# Patient Record
Sex: FEMALE | Race: BLACK OR AFRICAN AMERICAN | NOT HISPANIC OR LATINO | Employment: OTHER | URBAN - METROPOLITAN AREA
[De-identification: names, ages, dates, MRNs, and addresses within clinical notes are randomized per-mention and may not be internally consistent; named-entity substitution may affect disease eponyms.]

---

## 2017-08-28 ENCOUNTER — OFFICE VISIT (OUTPATIENT)
Dept: NEUROLOGY | Facility: HOSPITAL | Age: 77
End: 2017-08-28
Attending: INTERNAL MEDICINE
Payer: MEDICARE

## 2017-08-28 VITALS
SYSTOLIC BLOOD PRESSURE: 159 MMHG | HEART RATE: 64 BPM | WEIGHT: 126 LBS | BODY MASS INDEX: 20.25 KG/M2 | HEIGHT: 66 IN | DIASTOLIC BLOOD PRESSURE: 80 MMHG | TEMPERATURE: 97 F

## 2017-08-28 DIAGNOSIS — R10.9 ABDOMINAL PAIN, UNSPECIFIED LOCATION: Primary | ICD-10-CM

## 2017-08-28 PROCEDURE — 99214 OFFICE O/P EST MOD 30 MIN: CPT | Performed by: INTERNAL MEDICINE

## 2017-08-28 RX ORDER — AMLODIPINE BESYLATE 5 MG/1
5 TABLET ORAL DAILY
COMMUNITY
End: 2019-08-27 | Stop reason: SDUPTHER

## 2017-08-28 RX ORDER — METOPROLOL SUCCINATE 50 MG/1
50 TABLET, EXTENDED RELEASE ORAL DAILY
COMMUNITY
End: 2019-08-27

## 2017-08-28 NOTE — PROGRESS NOTES
"[facility]    CC: abdominal pain    HPI 77 y.o. female with past medical history of Bell's Palsy, HTN who presents for further evaluation of two months of mild, cramping abdominal pain near her umbilicus which lasts for a few minutes and then resolves following a BM.  She also reports recently her stools are watery about 2 days per week.  She reports that milk products giver her loose stools.  She has changed to lactaid milk but still has intermittent loose stools.  She denies use of sugar substitute products.  She has taken recent antibiotics-nitrofurantoin- for a recent UTI.    Previous she was prescribed bentyl but reports that she did not tolerate this due to lightheadedness and it made her sleepy.    Records reviewed.      Past Medical History:   Diagnosis Date    Arthritis     osteoarthritis of ankle    Hyperglycemia     Hypertension      Social History  No tobacco, alcohol or illicit drug use    Family History  No family history of colon cancer    Review of Systems  General ROS: negative for chills, fever.  Positive for fluctuations in her weight-both gain and loss  Gastrointestinal ROS: positive for abdominal cramping and loose stool.  No melena  Genito-Urinary ROS: no dysuria, trouble voiding, or hematuria    Physical Examination  BP (!) 159/80   Pulse 64   Temp 97.3 °F (36.3 °C) (Oral)   Ht 5' 6" (1.676 m)   Wt 57.2 kg (126 lb)   BMI 20.34 kg/m²   General appearance: alert, cooperative, no distress  HENT: Normocephalic, atraumatic, neck symmetrical, no nasal discharge   Eyes: conjunctivae/corneas clear, PERRL, EOM's intact  Lungs: clear to auscultation bilaterally, no dullness to percussion bilaterally  Heart: regular rate and rhythm without rub; no displacement of the PMI   Abdomen: soft, non-tender; bowel sounds normoactive; no organomegaly  Extremities: extremities symmetric; no clubbing, cyanosis, or edema  Integument: Skin color, texture, turgor normal; no rashes; hair distrubution " normal  Neurologic: Alert and oriented X 3, normal strength, normal coordination and gait  Psychiatric: no pressured speech; normal affect; no evidence of impaired cognition     Labs:  2006  H/H 13.7/42.7  Plt 165      Imaging: CXR 12/6/12 Clear lung fields bilaterally, no pneumothorax    Independently reviewed.    Assessment: The patient is a 78 yo female with past medical history of Bell's palsy who presents with recent onset of abdominal cramping and occasional loose stool associated with milk products as well as occasional constipation. She also reports a 10 lb weight loss over the past year but denies vomiting or early satiety.     Plan:   Conservative management for now as symptoms are variable, mild and currently improved. She lost 10 lbs over the past year but her weight is currently stable. She has no other symptoms which sound alarming.   Lactose free diet   Conservative management unless symptoms are progressive.   Consider CT scan abdomen/pelvis as next step if weight loss recurs then EGD if CT unrevealing and weight loss persists         Chaim Huitron MD   [address] [place] [address], [address]   [place] [id number] [phone]

## 2017-09-20 ENCOUNTER — TELEPHONE (OUTPATIENT)
Dept: NEUROLOGY | Facility: HOSPITAL | Age: 77
End: 2017-09-20

## 2017-09-20 NOTE — TELEPHONE ENCOUNTER
----- Message from Gretchen Fung sent at 9/20/2017 10:36 AM CDT -----  Contact: Patient  GI- Patient was returning Dr. Huitron call. Patient can be reached at [phone]

## 2017-09-28 DIAGNOSIS — Z12.31 VISIT FOR SCREENING MAMMOGRAM: Primary | ICD-10-CM

## 2017-10-06 ENCOUNTER — HOSPITAL ENCOUNTER (OUTPATIENT)
Dept: RADIOLOGY | Facility: HOSPITAL | Age: 77
Discharge: HOME | End: 2017-10-06
Attending: FAMILY MEDICINE
Payer: MEDICARE

## 2017-10-06 VITALS — HEIGHT: 66 IN | BODY MASS INDEX: 20.25 KG/M2 | WEIGHT: 126 LBS

## 2017-10-06 DIAGNOSIS — Z12.31 VISIT FOR SCREENING MAMMOGRAM: ICD-10-CM

## 2017-10-06 PROCEDURE — 77067 SCR MAMMO BI INCL CAD: CPT | Mod: 26,,, | Performed by: RADIOLOGY

## 2017-10-06 PROCEDURE — 77067 SCR MAMMO BI INCL CAD: CPT | Mod: TC

## 2018-08-27 ENCOUNTER — LAB VISIT (OUTPATIENT)
Dept: LAB | Facility: HOSPITAL | Age: 78
End: 2018-08-27
Attending: FAMILY MEDICINE
Payer: MEDICARE

## 2018-08-27 ENCOUNTER — OFFICE VISIT (OUTPATIENT)
Dept: FAMILY MEDICINE | Facility: HOSPITAL | Age: 78
End: 2018-08-27
Attending: FAMILY MEDICINE
Payer: MEDICARE

## 2018-08-27 VITALS
HEIGHT: 66 IN | BODY MASS INDEX: 21.11 KG/M2 | WEIGHT: 131.38 LBS | SYSTOLIC BLOOD PRESSURE: 139 MMHG | DIASTOLIC BLOOD PRESSURE: 76 MMHG | HEART RATE: 75 BPM

## 2018-08-27 DIAGNOSIS — I10 ESSENTIAL HYPERTENSION: ICD-10-CM

## 2018-08-27 DIAGNOSIS — R79.9 ABNORMAL FINDING OF BLOOD CHEMISTRY: ICD-10-CM

## 2018-08-27 DIAGNOSIS — R73.9 HYPERGLYCEMIA: ICD-10-CM

## 2018-08-27 DIAGNOSIS — R73.03 PREDIABETES: ICD-10-CM

## 2018-08-27 DIAGNOSIS — M81.0 AGE-RELATED OSTEOPOROSIS WITHOUT CURRENT PATHOLOGICAL FRACTURE: ICD-10-CM

## 2018-08-27 DIAGNOSIS — S29.011A MUSCLE STRAIN OF ANTERIOR CHEST WALL: Primary | ICD-10-CM

## 2018-08-27 LAB
ALBUMIN SERPL BCP-MCNC: 4 G/DL
ALP SERPL-CCNC: 68 U/L
ALT SERPL W/O P-5'-P-CCNC: 9 U/L
ANION GAP SERPL CALC-SCNC: 6 MMOL/L
AST SERPL-CCNC: 15 U/L
BASOPHILS # BLD AUTO: 0.01 K/UL
BASOPHILS NFR BLD: 0.2 %
BILIRUB SERPL-MCNC: 1.1 MG/DL
BUN SERPL-MCNC: 13 MG/DL
CALCIUM SERPL-MCNC: 9.9 MG/DL
CHLORIDE SERPL-SCNC: 104 MMOL/L
CHOLEST SERPL-MCNC: 238 MG/DL
CHOLEST/HDLC SERPL: 4.3 {RATIO}
CO2 SERPL-SCNC: 31 MMOL/L
CREAT SERPL-MCNC: 0.8 MG/DL
DIFFERENTIAL METHOD: ABNORMAL
EOSINOPHIL # BLD AUTO: 0.1 K/UL
EOSINOPHIL NFR BLD: 1.3 %
ERYTHROCYTE [DISTWIDTH] IN BLOOD BY AUTOMATED COUNT: 14.6 %
EST. GFR  (AFRICAN AMERICAN): >60 ML/MIN/1.73 M^2
EST. GFR  (NON AFRICAN AMERICAN): >60 ML/MIN/1.73 M^2
ESTIMATED AVG GLUCOSE: 108 MG/DL
GLUCOSE SERPL-MCNC: 90 MG/DL
HBA1C MFR BLD HPLC: 5.4 %
HCT VFR BLD AUTO: 44.5 %
HDLC SERPL-MCNC: 55 MG/DL
HDLC SERPL: 23.1 %
HGB BLD-MCNC: 14 G/DL
LDLC SERPL CALC-MCNC: 154 MG/DL
LYMPHOCYTES # BLD AUTO: 1.6 K/UL
LYMPHOCYTES NFR BLD: 31.1 %
MCH RBC QN AUTO: 27.6 PG
MCHC RBC AUTO-ENTMCNC: 31.5 G/DL
MCV RBC AUTO: 88 FL
MONOCYTES # BLD AUTO: 0.4 K/UL
MONOCYTES NFR BLD: 8.1 %
NEUTROPHILS # BLD AUTO: 3.1 K/UL
NEUTROPHILS NFR BLD: 59.3 %
NONHDLC SERPL-MCNC: 183 MG/DL
PLATELET # BLD AUTO: 225 K/UL
PMV BLD AUTO: 9.6 FL
POTASSIUM SERPL-SCNC: 4.2 MMOL/L
PROT SERPL-MCNC: 7.2 G/DL
RBC # BLD AUTO: 5.07 M/UL
SODIUM SERPL-SCNC: 141 MMOL/L
TRIGL SERPL-MCNC: 145 MG/DL
TSH SERPL DL<=0.005 MIU/L-ACNC: 1.4 UIU/ML
WBC # BLD AUTO: 5.21 K/UL

## 2018-08-27 PROCEDURE — 80061 LIPID PANEL: CPT

## 2018-08-27 PROCEDURE — 99213 OFFICE O/P EST LOW 20 MIN: CPT | Performed by: STUDENT IN AN ORGANIZED HEALTH CARE EDUCATION/TRAINING PROGRAM

## 2018-08-27 PROCEDURE — 80053 COMPREHEN METABOLIC PANEL: CPT

## 2018-08-27 PROCEDURE — 83036 HEMOGLOBIN GLYCOSYLATED A1C: CPT

## 2018-08-27 PROCEDURE — 84443 ASSAY THYROID STIM HORMONE: CPT

## 2018-08-27 PROCEDURE — 36415 COLL VENOUS BLD VENIPUNCTURE: CPT

## 2018-08-27 PROCEDURE — 85025 COMPLETE CBC W/AUTO DIFF WBC: CPT

## 2018-08-27 RX ORDER — METOPROLOL SUCCINATE 25 MG/1
TABLET, EXTENDED RELEASE ORAL
Refills: 1 | COMMUNITY
Start: 2018-08-08 | End: 2019-08-27

## 2018-08-27 RX ORDER — SULFAMETHOXAZOLE AND TRIMETHOPRIM 800; 160 MG/1; MG/1
TABLET ORAL
Refills: 0 | COMMUNITY
Start: 2018-06-08 | End: 2019-09-30

## 2018-08-27 RX ORDER — ESTRADIOL 0.1 MG/G
CREAM VAGINAL
Refills: 4 | COMMUNITY
Start: 2018-07-17 | End: 2020-01-06

## 2018-08-27 NOTE — PROGRESS NOTES
Subjective:       Patient ID: Laura Patel is a 78 y.o. female.    Chief Complaint: musculoskeletal pain    77 yo F with a past medical history of Arthritis, Hyperglycemia, osteoporosis, and Hypertension who presents to clinic for evaluation of muscle tenderness. Patient states that the pain began after lifting boxes when moving last month. The pain is in the L axillary region. Pain is worse with movement and is worse with palpation to the area. Has been taking tylenol with alleviation of the pain. Pain has been gradually improving since the inciting event.     Past Medical History:  No date: Arthritis      Comment:  osteoarthritis of ankle  No date: Hyperglycemia  No date: Hypertension    Past Surgical History:  No date: BREAST BIOPSY  No date: HYSTERECTOMY  No date: OOPHORECTOMY    Review of patient's family history indicates:  Problem: Breast cancer        Social History    Tobacco Use      Smoking status: Never Smoker      Smokeless tobacco: Never Used    Alcohol use: No    Drug use: No     Current Outpatient Medications on File Prior to Visit:  acetaminophen (TYLENOL) 325 MG tablet, Take 325 mg by mouth every 6 (six) hours as needed.  amlodipine (NORVASC) 5 MG tablet, Take 5 mg by mouth once daily.  metoprolol succinate (TOPROL-XL) 25 MG 24 hr tablet, TK 1 T PO D  metoprolol succinate (TOPROL-XL) 50 MG 24 hr tablet, Take 50 mg by mouth once daily.    Review of patient's allergies indicates:   -- Dicyclomine -- Other (See Comments)    --  Cause dizziness        Review of Systems   Constitutional: Negative for activity change, chills, fatigue and fever.   HENT: Negative for congestion and sinus pressure.    Eyes: Negative for visual disturbance.   Respiratory: Negative for chest tightness and shortness of breath.    Cardiovascular: Negative for chest pain.   Gastrointestinal: Negative for abdominal distention, abdominal pain, diarrhea, nausea and vomiting.   Endocrine: Negative for polyuria.   Genitourinary:  Negative for frequency.   Musculoskeletal: Positive for arthralgias and myalgias.   Skin: Negative for color change.   Neurological: Negative for dizziness, weakness and light-headedness.   Psychiatric/Behavioral: Negative for agitation and behavioral problems.       Objective:      Vitals:    08/27/18 0922   BP: 139/76   Pulse: 75     Physical Exam   Constitutional: She is oriented to person, place, and time. She appears well-developed and well-nourished. No distress.   HENT:   Head: Normocephalic and atraumatic.   Eyes: EOM are normal.   Neck: Normal range of motion. Neck supple.   Cardiovascular: Normal rate and regular rhythm.   Pulmonary/Chest: Effort normal and breath sounds normal.   Abdominal: Soft. Bowel sounds are normal. She exhibits no distension. There is no tenderness.   Musculoskeletal: She exhibits no edema or tenderness.   L axillary tender to palpation. Pain with movement. Full range of motion.    Neurological: She is alert and oriented to person, place, and time.   Skin: Skin is warm.   Psychiatric: She has a normal mood and affect. Her behavior is normal.       Assessment:       1. Muscle strain of anterior chest wall    2. Age-related osteoporosis without current pathological fracture    3. Essential hypertension    4. Hyperglycemia     5. Prediabetes     6. Abnormal finding of blood chemistry         Plan:       Muscle strain of anterior chest wall  Comments:  Will continue with tylenol prn    Age-related osteoporosis without current pathological fracture  -     Lipid panel; Future; Expected date: 08/27/2018  -     TSH; Future; Expected date: 08/27/2018    Essential hypertension  -     Hemoglobin A1c; Future; Expected date: 08/27/2018  -     CBC auto differential; Future; Expected date: 08/27/2018  -     Comprehensive metabolic panel; Future; Expected date: 08/27/2018    Hyperglycemia   -     Hemoglobin A1c; Future; Expected date: 08/27/2018    Prediabetes   -     TSH; Future; Expected date:  08/27/2018    Abnormal finding of blood chemistry   -     Lipid panel; Future; Expected date: 08/27/2018      Follow-up in about 4 weeks (around 9/24/2018) for review of labs.

## 2018-09-01 NOTE — PROGRESS NOTES
Case discussed with resident at time of visit.  I have reviewed and concur with the resident's evaluation, assessment, and plan.  Continue Tylenol for musculoskeletal pain LUE - improving post injury.

## 2019-08-27 ENCOUNTER — OFFICE VISIT (OUTPATIENT)
Dept: FAMILY MEDICINE | Facility: HOSPITAL | Age: 79
End: 2019-08-27
Attending: FAMILY MEDICINE
Payer: MEDICARE

## 2019-08-27 VITALS
HEART RATE: 78 BPM | SYSTOLIC BLOOD PRESSURE: 153 MMHG | HEIGHT: 66 IN | WEIGHT: 126.13 LBS | BODY MASS INDEX: 20.27 KG/M2 | DIASTOLIC BLOOD PRESSURE: 76 MMHG

## 2019-08-27 DIAGNOSIS — M80.00XS AGE-RELATED OSTEOPOROSIS WITH CURRENT PATHOLOGICAL FRACTURE, SEQUELA: Primary | ICD-10-CM

## 2019-08-27 DIAGNOSIS — I10 ESSENTIAL HYPERTENSION: ICD-10-CM

## 2019-08-27 DIAGNOSIS — K59.1 FUNCTIONAL DIARRHEA: ICD-10-CM

## 2019-08-27 PROCEDURE — 99213 OFFICE O/P EST LOW 20 MIN: CPT | Performed by: STUDENT IN AN ORGANIZED HEALTH CARE EDUCATION/TRAINING PROGRAM

## 2019-08-27 RX ORDER — AMLODIPINE BESYLATE 10 MG/1
10 TABLET ORAL DAILY
Qty: 30 TABLET | Refills: 11 | Status: SHIPPED | OUTPATIENT
Start: 2019-08-27 | End: 2020-03-09

## 2019-08-27 RX ORDER — OLMESARTAN MEDOXOMIL 40 MG/1
40 TABLET ORAL
COMMUNITY
Start: 2014-07-11 | End: 2019-08-27

## 2019-08-27 RX ORDER — RISEDRONATE SODIUM 150 MG/1
150 TABLET, FILM COATED ORAL
Qty: 1 TABLET | Refills: 11 | Status: SHIPPED | OUTPATIENT
Start: 2019-08-27 | End: 2019-08-27

## 2019-08-27 RX ORDER — LOPERAMIDE HYDROCHLORIDE 2 MG/1
2 CAPSULE ORAL 3 TIMES DAILY PRN
Qty: 30 CAPSULE | Refills: 3 | Status: SHIPPED | OUTPATIENT
Start: 2019-08-27 | End: 2019-09-06

## 2019-08-27 RX ORDER — RISEDRONATE SODIUM 150 MG/1
150 TABLET, FILM COATED ORAL
Qty: 1 TABLET | Refills: 11 | Status: SHIPPED | OUTPATIENT
Start: 2019-08-27 | End: 2019-09-30

## 2019-08-28 PROBLEM — K59.1 FUNCTIONAL DIARRHEA: Status: ACTIVE | Noted: 2019-08-28

## 2019-08-28 PROBLEM — I10 ESSENTIAL HYPERTENSION: Status: ACTIVE | Noted: 2019-08-28

## 2019-08-28 PROBLEM — M80.00XA AGE-RELATED OSTEOPOROSIS WITH CURRENT PATHOLOGICAL FRACTURE: Status: ACTIVE | Noted: 2019-08-28

## 2019-08-28 NOTE — PROGRESS NOTES
Subjective                                                                                                                                                                           Chief Complaint: ER follow up    Laura Patel is a 79 y.o. female who  has a past medical history of Arthritis, Hyperglycemia, and Hypertension. The patient presents to clinic for ER follow up. Pt with recent ER visit after a fall in which she sustained an orbital floor fracture. Pt states that she feels fine following the fall and has already had follow up with ENT.     Has questions regarding her medications and states that she needs refills on several of her medications. Pt states that she recently stopped taking a certain medication and after stopping that medication she has developed diarrhea.    HTN has been well controlled in the past but pressure is slightly elevated today. Will likely need to increase dose of norvasc.    Pt diagnosed with osteoporosis 5 years ago with DXA scan but is unaware of diagnosis and has never been on any medications. Will need labs re-ordered at this visit as well.     Health Maintenance   Topic Date Due    TETANUS VACCINE  04/11/1960    Pneumococcal Vaccine (65+ Low/Medium Risk) (1 of 2 - PCV13) 04/11/2007    DEXA SCAN  09/24/2015    Lipid Panel  08/27/2023        Review of Systems   Constitutional: Negative for activity change, chills, fatigue and fever.   HENT: Negative for congestion and sinus pressure.    Eyes: Negative for visual disturbance.   Respiratory: Negative for chest tightness and shortness of breath.    Cardiovascular: Negative for chest pain.   Gastrointestinal: Positive for diarrhea. Negative for abdominal distention, abdominal pain, blood in stool, nausea and vomiting.   Endocrine: Negative for polyuria.   Genitourinary: Negative for frequency.   Musculoskeletal: Negative for arthralgias and myalgias.   Skin: Positive for color change and wound (bruising to L face).  "  Neurological: Negative for dizziness, weakness and light-headedness.   Psychiatric/Behavioral: Negative for agitation and behavioral problems.        Objective                                                                                                                                                                             Vitals:    08/27/19 1122   BP: (!) 153/76   Pulse: 78   Weight: 57.2 kg (126 lb 1.7 oz)   Height: 5' 6" (1.676 m)      Body mass index is 20.35 kg/m².    Physical Exam   Constitutional: She is oriented to person, place, and time. She appears well-developed and well-nourished. No distress.   HENT:   Head: Normocephalic.   Bruising observed to L orbital area. No deformities noted.    Eyes: Conjunctivae are normal.   Neck: Normal range of motion. Neck supple.   Cardiovascular: Normal rate and regular rhythm.   Pulmonary/Chest: Effort normal and breath sounds normal.   Abdominal: Soft. Bowel sounds are normal. She exhibits no distension. There is no tenderness.   Musculoskeletal: She exhibits no edema or tenderness.   Neurological: She is alert and oriented to person, place, and time.   Skin: Skin is warm.   Psychiatric: She has a normal mood and affect. Her behavior is normal.   Nursing note and vitals reviewed.      Laboratory:  Lab Results   Component Value Date    WBC 5.21 08/27/2018    HGB 14.0 08/27/2018    HCT 44.5 08/27/2018    MCV 88 08/27/2018     08/27/2018       Chemistry        Component Value Date/Time     08/27/2019 1246    K 3.9 08/27/2019 1246     08/27/2019 1246    CO2 30 (H) 08/27/2019 1246    BUN 9 08/27/2019 1246    CREATININE 0.9 08/27/2019 1246    GLU 94 08/27/2019 1246        Component Value Date/Time    CALCIUM 9.7 08/27/2019 1246    ALKPHOS 89 08/27/2019 1246    AST 22 08/27/2019 1246    ALT 17 08/27/2019 1246    BILITOT 1.4 (H) 08/27/2019 1246    ESTGFRAFRICA >60 08/27/2019 1246    EGFRNONAA >60 08/27/2019 1246          Lab Results   Component " Value Date    PHOS 3.5 08/27/2019     Lab Results   Component Value Date    CHOL 238 (H) 08/27/2018    HDL 55 08/27/2018    LDLCALC 154.0 08/27/2018    TRIG 145 08/27/2018     The 10-year ASCVD risk score (Andrew CHENG Jr., et al., 2013) is: 24.1%    Values used to calculate the score:      Age: 79 years      Sex: Female      Is Non- : Yes      Diabetic: No      Tobacco smoker: No      Systolic Blood Pressure: 153 mmHg      Is BP treated: Yes      HDL Cholesterol: 55 mg/dL      Total Cholesterol: 238 mg/dL     Lab Results   Component Value Date    TSH 1.404 08/27/2018     Lab Results   Component Value Date    HGBA1C 5.4 08/27/2018     Assessment/Plan                                                                                                                                                                Laura Patel is a 79 y.o. female who presents to clinic with:    1. Age-related osteoporosis with current pathological fracture, sequela    2. Essential hypertension    3. Functional diarrhea         Problem List Items Addressed This Visit        Cardiac/Vascular    Essential hypertension    Relevant Medications    amLODIPine (NORVASC) 10 MG tablet       GI    Functional diarrhea    Relevant Medications    loperamide (IMODIUM) 2 mg capsule       Orthopedic    Age-related osteoporosis with current pathological fracture - Primary    Relevant Medications    risedronate (ACTONEL) 150 MG Tab    Other Relevant Orders    Comprehensive metabolic panel (Completed)    Vitamin D (Completed)    DXA Bone Density Spine And Hip    Phosphorus (Completed)    PTH, intact (Completed)          Medication List with Changes/Refills   New Medications    LOPERAMIDE (IMODIUM) 2 MG CAPSULE    Take 1 capsule (2 mg total) by mouth 3 (three) times daily as needed for Diarrhea.    RISEDRONATE (ACTONEL) 150 MG TAB    Take 1 tablet (150 mg total) by mouth every 30 days.   Current Medications    ACETAMINOPHEN (TYLENOL) 325 MG  TABLET    Take 325 mg by mouth every 6 (six) hours as needed.    ACYCLOVIR (ZOVIRAX) 800 MG TAB    Take 1 tablet (800 mg total) by mouth 5 (five) times daily.    ESTRADIOL (ESTRACE) 0.01 % (0.1 MG/GRAM) VAGINAL CREAM        SULFAMETHOXAZOLE-TRIMETHOPRIM 800-160MG (BACTRIM DS) 800-160 MG TAB    TK 1 T PO 3 TIMES Q WK FOR 7 DAYS   Changed and/or Refilled Medications    Modified Medication Previous Medication    AMLODIPINE (NORVASC) 10 MG TABLET amlodipine (NORVASC) 5 MG tablet       Take 1 tablet (10 mg total) by mouth once daily.    Take 5 mg by mouth once daily.   Discontinued Medications    METOPROLOL SUCCINATE (TOPROL-XL) 25 MG 24 HR TABLET    TK 1 T PO D    METOPROLOL SUCCINATE (TOPROL-XL) 50 MG 24 HR TABLET    Take 50 mg by mouth once daily.    OLMESARTAN (BENICAR) 40 MG TABLET    Take 40 mg by mouth.       Patient counseled about the importance of healthy dietary habits as well as routine physical activity and exercise for better health outcomes.    The patient's diagnosis, medications, and proper use of medications were dicussed. The importance of close follow up to discuss labs, modify medications, and monitor any potential side effects was also discussed.     Follow up in about 3 weeks (around 9/17/2019). Will review labs at that time. Follow up for further workup and reassessment or sooner as needed.    Patient expressed understanding after counseling regarding diagnosis and recommendations.        Julian Mason MD  [facility] PGY-3

## 2019-08-29 ENCOUNTER — TELEPHONE (OUTPATIENT)
Dept: FAMILY MEDICINE | Facility: HOSPITAL | Age: 79
End: 2019-08-29

## 2019-09-05 DIAGNOSIS — M81.0 AGE-RELATED OSTEOPOROSIS WITHOUT CURRENT PATHOLOGICAL FRACTURE: Primary | ICD-10-CM

## 2019-09-30 ENCOUNTER — OFFICE VISIT (OUTPATIENT)
Dept: FAMILY MEDICINE | Facility: HOSPITAL | Age: 79
End: 2019-09-30
Attending: FAMILY MEDICINE
Payer: MEDICARE

## 2019-09-30 VITALS
WEIGHT: 125.88 LBS | HEART RATE: 83 BPM | HEIGHT: 66 IN | DIASTOLIC BLOOD PRESSURE: 83 MMHG | SYSTOLIC BLOOD PRESSURE: 157 MMHG | BODY MASS INDEX: 20.23 KG/M2

## 2019-09-30 DIAGNOSIS — M80.00XS OSTEOPOROSIS WITH CURRENT PATHOLOGICAL FRACTURE, UNSPECIFIED OSTEOPOROSIS TYPE, SEQUELA: Primary | ICD-10-CM

## 2019-09-30 DIAGNOSIS — E55.9 VITAMIN D INSUFFICIENCY: ICD-10-CM

## 2019-09-30 DIAGNOSIS — D12.6 COLONIC ADENOMA: ICD-10-CM

## 2019-09-30 DIAGNOSIS — I10 ESSENTIAL HYPERTENSION: ICD-10-CM

## 2019-09-30 PROCEDURE — 99213 OFFICE O/P EST LOW 20 MIN: CPT | Performed by: STUDENT IN AN ORGANIZED HEALTH CARE EDUCATION/TRAINING PROGRAM

## 2019-09-30 RX ORDER — VIT C/E/ZN/COPPR/LUTEIN/ZEAXAN 250MG-90MG
2000 CAPSULE ORAL DAILY
Qty: 60 CAPSULE | Refills: 11 | Status: SHIPPED | OUTPATIENT
Start: 2019-09-30 | End: 2019-09-30

## 2019-09-30 NOTE — PROGRESS NOTES
Subjective                                                                                                                                                                           Chief Complaint: Osteoporosis follow up    Laura Patel is a 79 y.o. female who  has a past medical history of Arthritis, Hyperglycemia, and Hypertension. The patient presents to clinic for follow up. Labs ordered at last visit reviewed with pt. Pt scheduled for repeat DXA scan but was not approved by insurance.     Pt with recent ER visit after a fall in which she sustained an orbital floor fracture. Pt states that she feels fine following the fall and has already had follow up with ENT. Patient is also being worked up by ENT for mass on L side of neck.     HTN has been well controlled in the past but pressure remains slightly elevated. Pt did not increase dose of norvasc as was discussed at last visit.     Vit D found to be low on labs. Pt has never been on either bisphosphonate or vit D but will need to begin.    Per chart review, colonoscopy 6 years ago revealed polyps, one of which was found to be adenoma and thus patient needed repeat colonoscopy in 5 years and is now overdue for one.     Health Maintenance   Topic Date Due    TETANUS VACCINE  04/11/1960    Pneumococcal Vaccine (65+ Low/Medium Risk) (1 of 2 - PCV13) 04/11/2007    DEXA SCAN  09/24/2015    Lipid Panel  08/27/2023        Review of Systems   Constitutional: Negative for activity change, chills, fatigue and fever.   HENT: Negative for congestion and sinus pressure.    Eyes: Negative for visual disturbance.   Respiratory: Negative for chest tightness and shortness of breath.    Cardiovascular: Negative for chest pain.   Gastrointestinal: Negative for abdominal distention, abdominal pain, blood in stool, diarrhea, nausea and vomiting.   Endocrine: Negative for polyuria.   Genitourinary: Negative for frequency.   Musculoskeletal: Positive for back pain. Negative for  "arthralgias and myalgias.   Skin: Negative for color change and wound.   Neurological: Negative for dizziness, weakness and light-headedness.   Psychiatric/Behavioral: Negative for agitation and behavioral problems.        Objective                                                                                                                                                                             Vitals:    09/30/19 1312   BP: (!) 157/83   BP Location: Right arm   Patient Position: Sitting   BP Method: Medium (Automatic)   Pulse: 83   Weight: 57.1 kg (125 lb 14.1 oz)   Height: 5' 6" (1.676 m)      Body mass index is 20.32 kg/m².    Physical Exam   Constitutional: She is oriented to person, place, and time. She appears well-developed and well-nourished. No distress.   HENT:   Head: Normocephalic.   Eyes: Conjunctivae are normal.   Neck: Normal range of motion. Neck supple.   Palpable mass to L side of neck   Cardiovascular: Normal rate and regular rhythm.   Pulmonary/Chest: Effort normal and breath sounds normal.   Abdominal: Soft. Bowel sounds are normal. She exhibits no distension. There is no tenderness.   Musculoskeletal: She exhibits no edema or tenderness.   Neurological: She is alert and oriented to person, place, and time.   Skin: Skin is warm.   Psychiatric: She has a normal mood and affect. Her behavior is normal.   Nursing note and vitals reviewed.      Laboratory:  Lab Results   Component Value Date    WBC 5.21 08/27/2018    HGB 14.0 08/27/2018    HCT 44.5 08/27/2018    MCV 88 08/27/2018     08/27/2018       Chemistry        Component Value Date/Time     08/27/2019 1246    K 3.9 08/27/2019 1246     08/27/2019 1246    CO2 30 (H) 08/27/2019 1246    BUN 9 08/27/2019 1246    CREATININE 0.9 08/27/2019 1246    GLU 94 08/27/2019 1246        Component Value Date/Time    CALCIUM 9.7 08/27/2019 1246    ALKPHOS 89 08/27/2019 1246    AST 22 08/27/2019 1246    ALT 17 08/27/2019 1246    BILITOT " 1.4 (H) 08/27/2019 1246    ESTGFRAFRICA >60 08/27/2019 1246    EGFRNONAA >60 08/27/2019 1246          Lab Results   Component Value Date    PHOS 3.5 08/27/2019     Lab Results   Component Value Date    CHOL 238 (H) 08/27/2018    HDL 55 08/27/2018    LDLCALC 154.0 08/27/2018    TRIG 145 08/27/2018       Lab Results   Component Value Date    TSH 1.404 08/27/2018     Lab Results   Component Value Date    HGBA1C 5.4 08/27/2018     Assessment/Plan                                                                                                                                                                Laura Patel is a 79 y.o. female who presents to clinic with:    1. Osteoporosis with current pathological fracture, unspecified osteoporosis type, sequela    2. Vitamin D insufficiency    3. Essential hypertension    4. Colonic adenoma         Problem List Items Addressed This Visit        Cardiac/Vascular    Essential hypertension       Endocrine    Vitamin D insufficiency    Relevant Medications    alendronate-vitamin D3 (FOSAMAX PLUS D) 70 mg- 5,600 unit per tablet       GI    Colonic adenoma    Relevant Orders    Ambulatory referral to Gastroenterology      Other Visit Diagnoses     Osteoporosis with current pathological fracture, unspecified osteoporosis type, sequela    -  Primary    Relevant Medications    alendronate-vitamin D3 (FOSAMAX PLUS D) 70 mg- 5,600 unit per tablet    Other Relevant Orders    DXA Bone Density Spine And Hip        Pt did not increase dose of norvasc to 10 mg at last visit. Pt states that she will discuss at her upcoming cardiology appointment.     Medication List with Changes/Refills   New Medications    ALENDRONATE-VITAMIN D3 (FOSAMAX PLUS D) 70 MG- 5,600 UNIT PER TABLET    Take one pill weekly.   Current Medications    ACETAMINOPHEN (TYLENOL) 325 MG TABLET    Take 325 mg by mouth every 6 (six) hours as needed.    AMLODIPINE (NORVASC) 10 MG TABLET    Take 1 tablet (10 mg total) by mouth  once daily.    ESTRADIOL (ESTRACE) 0.01 % (0.1 MG/GRAM) VAGINAL CREAM       Discontinued Medications    ACYCLOVIR (ZOVIRAX) 800 MG TAB    Take 1 tablet (800 mg total) by mouth 5 (five) times daily.    RISEDRONATE (ACTONEL) 150 MG TAB    Take 1 tablet (150 mg total) by mouth every 30 days.    SULFAMETHOXAZOLE-TRIMETHOPRIM 800-160MG (BACTRIM DS) 800-160 MG TAB    TK 1 T PO 3 TIMES Q WK FOR 7 DAYS       Patient counseled about the importance of healthy dietary habits as well as routine physical activity and exercise for better health outcomes.    The patient's diagnosis, medications, and proper use of medications were dicussed. The importance of close follow up to discuss labs, modify medications, and monitor any potential side effects was also discussed.     Follow up in about 4 weeks (around 10/28/2019). Will review labs at that time. Follow up for further workup and reassessment or sooner as needed.    Patient expressed understanding after counseling regarding diagnosis and recommendations.        Julian Mason MD  [facility]  PGY-3

## 2019-10-16 NOTE — PROGRESS NOTES
I have reviewed the notes, assessments, and/or procedures performed, I concur with her/his documentation of Laura Patel.

## 2019-10-31 ENCOUNTER — TELEPHONE (OUTPATIENT)
Dept: GASTROENTEROLOGY | Facility: CLINIC | Age: 79
End: 2019-10-31

## 2019-11-08 ENCOUNTER — HOSPITAL ENCOUNTER (OUTPATIENT)
Dept: RADIOLOGY | Facility: HOSPITAL | Age: 79
Discharge: HOME | End: 2019-11-08
Attending: STUDENT IN AN ORGANIZED HEALTH CARE EDUCATION/TRAINING PROGRAM
Payer: MEDICARE

## 2019-11-08 DIAGNOSIS — M80.00XS OSTEOPOROSIS WITH CURRENT PATHOLOGICAL FRACTURE, UNSPECIFIED OSTEOPOROSIS TYPE, SEQUELA: ICD-10-CM

## 2019-11-08 PROCEDURE — 77080 DXA BONE DENSITY AXIAL: CPT | Mod: 26,,, | Performed by: RADIOLOGY

## 2019-11-08 PROCEDURE — 77080 DEXA BONE DENSITY SPINE HIP: ICD-10-PCS | Mod: 26,,, | Performed by: RADIOLOGY

## 2019-11-08 PROCEDURE — 77080 DXA BONE DENSITY AXIAL: CPT | Mod: TC

## 2019-11-20 ENCOUNTER — OFFICE VISIT (OUTPATIENT)
Dept: FAMILY MEDICINE | Facility: HOSPITAL | Age: 79
End: 2019-11-20
Attending: FAMILY MEDICINE
Payer: MEDICARE

## 2019-11-20 VITALS
DIASTOLIC BLOOD PRESSURE: 78 MMHG | HEIGHT: 66 IN | HEART RATE: 84 BPM | WEIGHT: 124.56 LBS | SYSTOLIC BLOOD PRESSURE: 139 MMHG | BODY MASS INDEX: 20.02 KG/M2

## 2019-11-20 DIAGNOSIS — E21.1 HYPERPARATHYROIDISM DUE TO VITAMIN D DEFICIENCY: ICD-10-CM

## 2019-11-20 DIAGNOSIS — E55.9 VITAMIN D INSUFFICIENCY: ICD-10-CM

## 2019-11-20 DIAGNOSIS — M80.00XS OSTEOPOROSIS WITH CURRENT PATHOLOGICAL FRACTURE, UNSPECIFIED OSTEOPOROSIS TYPE, SEQUELA: Primary | ICD-10-CM

## 2019-11-20 DIAGNOSIS — D12.6 COLONIC ADENOMA: ICD-10-CM

## 2019-11-20 PROBLEM — M80.00XA OSTEOPOROSIS WITH CURRENT PATHOLOGICAL FRACTURE: Status: ACTIVE | Noted: 2019-11-20

## 2019-11-20 PROCEDURE — 99213 OFFICE O/P EST LOW 20 MIN: CPT | Performed by: STUDENT IN AN ORGANIZED HEALTH CARE EDUCATION/TRAINING PROGRAM

## 2019-11-21 NOTE — PROGRESS NOTES
Subjective                                                                                                                                                                           Chief Complaint: Lab follow up    Laura Patel is a 79 y.o. female who  has a past medical history of Arthritis, Hyperglycemia, and Hypertension. The patient presents to clinic for follow up. Labs ordered at last visit reviewed with pt. Results of repeat DXA scan reviewed with patient.     HTN has been well controlled on amlodipine 5 mg.     Vit D found to be low on labs. Pt has never been on either bisphosphonate or vit D and will initiate.     Per chart review, colonoscopy 6 years ago revealed polyps, one of which was found to be adenoma and thus patient needed repeat colonoscopy in 5 years. Previously referral to GI fell through and will require another at this time.     Health Maintenance   Topic Date Due    TETANUS VACCINE  04/11/1960    Pneumococcal Vaccine (65+ Low/Medium Risk) (1 of 2 - PCV13) 04/11/2007    DEXA SCAN  11/08/2022    Lipid Panel  08/27/2023        Review of Systems   Constitutional: Negative for activity change, chills, fatigue and fever.   HENT: Negative for congestion and sinus pressure.    Eyes: Negative for visual disturbance.   Respiratory: Negative for chest tightness and shortness of breath.    Cardiovascular: Negative for chest pain.   Gastrointestinal: Negative for abdominal distention, abdominal pain, blood in stool, diarrhea, nausea and vomiting.   Endocrine: Negative for polyuria.   Genitourinary: Negative for frequency.   Musculoskeletal: Positive for back pain. Negative for arthralgias and myalgias.   Skin: Negative for color change and wound.   Neurological: Negative for dizziness, weakness and light-headedness.   Psychiatric/Behavioral: Negative for agitation and behavioral problems.        Objective                                                                                                   "                                                                           Vitals:    11/20/19 1053   BP: 139/78   Pulse: 84   Weight: 56.5 kg (124 lb 9 oz)   Height: 5' 6" (1.676 m)      Body mass index is 20.1 kg/m².    Physical Exam   Constitutional: She is oriented to person, place, and time. She appears well-developed and well-nourished. No distress.   HENT:   Head: Normocephalic.   Eyes: Conjunctivae are normal.   Neck: Normal range of motion. Neck supple.   Palpable mass to L side of neck   Cardiovascular: Normal rate and regular rhythm.   Pulmonary/Chest: Effort normal and breath sounds normal.   Abdominal: Soft. Bowel sounds are normal. She exhibits no distension. There is no tenderness.   Musculoskeletal: She exhibits no edema or tenderness.   Neurological: She is alert and oriented to person, place, and time.   Skin: Skin is warm.   Psychiatric: She has a normal mood and affect. Her behavior is normal.   Nursing note and vitals reviewed.    Laboratory:  Lab Results   Component Value Date    WBC 5.21 08/27/2018    HGB 14.0 08/27/2018    HCT 44.5 08/27/2018    MCV 88 08/27/2018     08/27/2018       Chemistry        Component Value Date/Time     08/27/2019 1246    K 3.9 08/27/2019 1246     08/27/2019 1246    CO2 30 (H) 08/27/2019 1246    BUN 9 08/27/2019 1246    CREATININE 0.9 08/27/2019 1246    GLU 94 08/27/2019 1246        Component Value Date/Time    CALCIUM 9.7 08/27/2019 1246    ALKPHOS 89 08/27/2019 1246    AST 22 08/27/2019 1246    ALT 17 08/27/2019 1246    BILITOT 1.4 (H) 08/27/2019 1246    ESTGFRAFRICA >60 08/27/2019 1246    EGFRNONAA >60 08/27/2019 1246          Lab Results   Component Value Date    PHOS 3.5 08/27/2019     Lab Results   Component Value Date    CHOL 238 (H) 08/27/2018    HDL 55 08/27/2018    LDLCALC 154.0 08/27/2018    TRIG 145 08/27/2018       Lab Results   Component Value Date    TSH 1.404 08/27/2018     Lab Results   Component Value Date    HGBA1C 5.4 " 08/27/2018     Assessment/Plan                                                                                                                                                                Laura Patel is a 79 y.o. female who presents to clinic with:    1. Osteoporosis with current pathological fracture, unspecified osteoporosis type, sequela    2. Vitamin D insufficiency    3. Hyperparathyroidism due to vitamin D deficiency    4. Colonic adenoma         Problem List Items Addressed This Visit        Endocrine    Vitamin D insufficiency    Relevant Medications    alendronate-vitamin D3 (FOSAMAX PLUS D) 70 mg- 5,600 unit per tablet    Hyperparathyroidism due to vitamin D deficiency       GI    Colonic adenoma    Relevant Orders    Case request GI: COLONOSCOPY (Completed)       Orthopedic    Osteoporosis with current pathological fracture - Primary    Relevant Medications    alendronate-vitamin D3 (FOSAMAX PLUS D) 70 mg- 5,600 unit per tablet          Medication List with Changes/Refills   Current Medications    ACETAMINOPHEN (TYLENOL) 325 MG TABLET    Take 325 mg by mouth every 6 (six) hours as needed.    AMLODIPINE (NORVASC) 10 MG TABLET    Take 1 tablet (10 mg total) by mouth once daily.    ESTRADIOL (ESTRACE) 0.01 % (0.1 MG/GRAM) VAGINAL CREAM       Changed and/or Refilled Medications    Modified Medication Previous Medication    ALENDRONATE-VITAMIN D3 (FOSAMAX PLUS D) 70 MG- 5,600 UNIT PER TABLET alendronate-vitamin D3 (FOSAMAX PLUS D) 70 mg- 5,600 unit per tablet       Take one pill weekly.    Take one pill weekly.       Patient counseled about the importance of healthy dietary habits as well as routine physical activity and exercise for better health outcomes.    The patient's diagnosis, medications, and proper use of medications were dicussed. The importance of close follow up to discuss labs, modify medications, and monitor any potential side effects was also discussed.     Follow up in about 3 months  (around 2/20/2020), or if symptoms worsen or fail to improve. Will review labs at that time. Follow up for further workup and reassessment or sooner as needed.    Patient expressed understanding after counseling regarding diagnosis and recommendations.        Julian Mason MD  [employer] [facility] PGY-3

## 2019-12-12 ENCOUNTER — OFFICE VISIT (OUTPATIENT)
Dept: GASTROENTEROLOGY | Facility: CLINIC | Age: 79
End: 2019-12-12
Payer: MEDICARE

## 2019-12-12 VITALS
SYSTOLIC BLOOD PRESSURE: 151 MMHG | BODY MASS INDEX: 20.78 KG/M2 | WEIGHT: 128.75 LBS | DIASTOLIC BLOOD PRESSURE: 68 MMHG | HEART RATE: 86 BPM

## 2019-12-12 DIAGNOSIS — K63.5 POLYP OF COLON, UNSPECIFIED PART OF COLON, UNSPECIFIED TYPE: Primary | ICD-10-CM

## 2019-12-12 DIAGNOSIS — R74.8 ABNORMAL LIVER ENZYMES: ICD-10-CM

## 2019-12-12 DIAGNOSIS — R19.7 DIARRHEA, UNSPECIFIED TYPE: ICD-10-CM

## 2019-12-12 PROCEDURE — 1126F AMNT PAIN NOTED NONE PRSNT: CPT | Mod: S$GLB,,, | Performed by: INTERNAL MEDICINE

## 2019-12-12 PROCEDURE — 99214 OFFICE O/P EST MOD 30 MIN: CPT | Mod: S$GLB,,, | Performed by: INTERNAL MEDICINE

## 2019-12-12 PROCEDURE — 1159F PR MEDICATION LIST DOCUMENTED IN MEDICAL RECORD: ICD-10-PCS | Mod: S$GLB,,, | Performed by: INTERNAL MEDICINE

## 2019-12-12 PROCEDURE — 3078F DIAST BP <80 MM HG: CPT | Mod: CPTII,S$GLB,, | Performed by: INTERNAL MEDICINE

## 2019-12-12 PROCEDURE — 99214 PR OFFICE/OUTPT VISIT, EST, LEVL IV, 30-39 MIN: ICD-10-PCS | Mod: S$GLB,,, | Performed by: INTERNAL MEDICINE

## 2019-12-12 PROCEDURE — 99999 PR PBB SHADOW E&M-EST. PATIENT-LVL III: CPT | Mod: PBBFAC,,, | Performed by: INTERNAL MEDICINE

## 2019-12-12 PROCEDURE — 99999 PR PBB SHADOW E&M-EST. PATIENT-LVL III: ICD-10-PCS | Mod: PBBFAC,,, | Performed by: INTERNAL MEDICINE

## 2019-12-12 PROCEDURE — 3077F PR MOST RECENT SYSTOLIC BLOOD PRESSURE >= 140 MM HG: ICD-10-PCS | Mod: CPTII,S$GLB,, | Performed by: INTERNAL MEDICINE

## 2019-12-12 PROCEDURE — 3077F SYST BP >= 140 MM HG: CPT | Mod: CPTII,S$GLB,, | Performed by: INTERNAL MEDICINE

## 2019-12-12 PROCEDURE — 1101F PT FALLS ASSESS-DOCD LE1/YR: CPT | Mod: CPTII,S$GLB,, | Performed by: INTERNAL MEDICINE

## 2019-12-12 PROCEDURE — 1159F MED LIST DOCD IN RCRD: CPT | Mod: S$GLB,,, | Performed by: INTERNAL MEDICINE

## 2019-12-12 PROCEDURE — 3078F PR MOST RECENT DIASTOLIC BLOOD PRESSURE < 80 MM HG: ICD-10-PCS | Mod: CPTII,S$GLB,, | Performed by: INTERNAL MEDICINE

## 2019-12-12 PROCEDURE — 1101F PR PT FALLS ASSESS DOC 0-1 FALLS W/OUT INJ PAST YR: ICD-10-PCS | Mod: CPTII,S$GLB,, | Performed by: INTERNAL MEDICINE

## 2019-12-12 PROCEDURE — 1126F PR PAIN SEVERITY QUANTIFIED, NO PAIN PRESENT: ICD-10-PCS | Mod: S$GLB,,, | Performed by: INTERNAL MEDICINE

## 2019-12-12 RX ORDER — SODIUM, POTASSIUM,MAG SULFATES 17.5-3.13G
1 SOLUTION, RECONSTITUTED, ORAL ORAL DAILY
Qty: 1 KIT | Refills: 0 | Status: SHIPPED | OUTPATIENT
Start: 2019-12-12 | End: 2019-12-14

## 2019-12-12 NOTE — PATIENT INSTRUCTIONS
SUPREP Instructions    You are scheduled for a colonoscopy with Dr. Moss on 1/29/2020 at [facility]  To ensure that your test is accurate and complete, you MUST follow these instructions listed below.  If you have any questions, please call our office at [phone].  Plan on being at the hospital for your procedure for 3-4 hours.    1.  Follow a CLEAR LIQUID DIET for the entire day before your scheduled colonoscopy.  This means no solid food the entire day starting when you wake.  You may have as much of the clear liquids as you want throughout the day.   CLEAR LIQUID DIET:   - Avoid Red, Orange, Purple, and/or Blue food coloring   - NO DAIRY   - You can have:  Coffee with sugar (no creamer), tea, water, soda, apple or white grape juice, chicken or beef broth/bouillon (no meat, noodles, or veggies), green/yellow popsicles, green/yellow Jell-O, lemonade.    2.  AT 5 pm the evening before your colonoscopy, POUR ONE (1) BOTTLE OF SUPREP INTO THE MIXING CONTAINER, PROVIDED INSIDE THE BOX.  ADD WATER TO THE LINE ON THE CONTAINER AND MIX IT WELL.  DRINK THE ENTIRE CONTAINER AND THEN DRINK TWO (2) MORE CONTAINERS OF WATER OVER THE NEXT 1 HOUR.  This is sometimes easier to drink if this solution is cold, so you can mix the solution 20 minutes ahead of time and place in the refrigerator prior to drinking.  You have to drink the solution within 30-45 minutes of mixing it.  Do NOT put this solution over ice.  It IS ok to drink with a straw.    3.  The endoscopy department will call you 1 day before your colonoscopy to tell you the exact time to arrive, AND to tell you the exact time to drink the 2nd portion of your prep (which will be FIVE HOURS BEFORE YOUR ARRIVAL TIME).  At this time given to you, POUR ONE (1) BOTTLE OF SUPREP INTO THE MIXING CONTAINER, PROVIDED INSIDE THE BOX.  ADD WATER TO THE LINE ON THE CONTAINER AND MIX IT WELL.  DRINK THE ENTIRE CONTAINER AND THEN DRINK TWO (2) MORE CONTAINERS OF WATER OVER  THE NEXT 1 HOUR.  This is sometimes easier to drink if this solution is cold, so you can mix the solution 20 minutes ahead of time and place in the refrigerator prior to drinking.  You have to drink the solution within 30-45 minutes of mixing it.  Do NOT put this solution over ice.  It IS ok to drink with a straw.  Once this is complete, you may not have ANYTHING else by mouth!    4.  You must have someone with you to DRIVE YOU HOME since you will be receiving IV sedation for the colonoscopy.    5.  It is ok to take your heart, blood pressure, and seizure medications in the morning of your test with a SIP of water.  Hold other medications until after your procedure.  Do NOT have anything else to eat or drink the morning of your colonoscopy.  It is ok to brush your teeth.    6.  If you are on blood thinners THAT YOU HAVE BEEN INSTRUCTED TO HOLD BY YOUR DOCTOR FOR THIS PROCEDURE, then do NOT take this the morning of your colonoscopy.  Do NOT stop these medications on your own, they must be approved to be held by your doctor.  Your colonoscopy can NOT be done if you are on these medications.  Examples of blood thinners include: Coumadin, Aggrenox, Plavix, Pradaxa, Reapro, Pletal, Xarelto, Ticagrelor, Brilinta, Eliquis, and high dose aspirin (325 mg).  You do not have to stop baby aspirin 81 mg.    7.  IF YOU ARE DIABETIC:  NO INSULIN OR ORAL MEDICATIONS THE MORNING OF THE COLONOSCOPY.  TAKE ONLY HALF THE DOSE OF YOUR INSULIN THE DAY BEFORE THE COLONOSCOPY.  DO NOT TAKE ANY ORAL DIABETIC MEDICATIONS THE DAY BEFORE THE COLONOSCOPY.  IF YOU ARE AN INSULIN DEPENDENT DIABETIC WITH UNSTABLE BLOOD SUGARS, NOTIFY YOUR PRIMARY CARE PHYSICIAN FOR INSTRUCTIONS.

## 2019-12-12 NOTE — LETTER
December 12, 2019      Julian Mason MD  [address]  [place] [address]           [facility]  [address]  [place] [address]  Phone: [phone]          Patient: Laura Patel   MR Number: [id number]   YOB: 1940   Date of Visit: 12/12/2019       Dear Dr. Julian Mason:    Thank you for referring Laura Patel to me for evaluation. Attached you will find relevant portions of my assessment and plan of care.    If you have questions, please do not hesitate to call me. I look forward to following Laura Patel along with you.    Sincerely,    Neal Moss MD    Enclosure  CC:  No Recipients    If you would like to receive this communication electronically, please contact [email or web address] or [phone] to request more information on [facility] access.    For providers and/or their staff who would like to refer a patient to [facility], please contact us through our one-stop-shop provider referral line, [facility], [place], at [phone].    If you feel you have received this communication in error or would no longer like to receive these types of communications, please e-mail [email or web address]

## 2019-12-12 NOTE — PROGRESS NOTES
Subjective:       Patient ID: Laura Patel is a 79 y.o. female.    Chief Complaint: Diarrhea    Patient here today to establish care with aforementioned complaints.  Patient reports recent change in bowel habits.  Patient reports the past 2 months experiencing intermittent diarrhea and constipation. She reports 1 episodes of hematochezia while visiting son in [place].  She presented to a local emergency department, at which time no blood was found.  She was subsequently discharged without admission.  Patient reports associated gas/cramping however is controlled by avoiding offending foods.  She reports occasional heartburn however denies dysphagia or odynophagia.    Otherwise patient has been found to have elevated liver test previously.  She is on aware of this finding.  She denies significant alcohol use.  Denies family history of liver disease or GI malignancy.    Surgical history significant for a hysterectomy.  Her most recent colonoscopy was in 2013.  One low risk adenoma was found, in addition to diverticulosis and hemorrhoids.  Five year recall interval was recommended.    Past Medical History:   Diagnosis Date    Arthritis     osteoarthritis of ankle    Hyperglycemia     Hypertension        Past Surgical History:   Procedure Laterality Date    BREAST BIOPSY      HYSTERECTOMY      OOPHORECTOMY         Social History  Social History     Tobacco Use    Smoking status: Never Smoker    Smokeless tobacco: Never Used   Substance Use Topics    Alcohol use: No    Drug use: No       Family History   Problem Relation Age of Onset    Breast cancer Other        Review of Systems   Constitutional: Negative for chills, fever and unexpected weight change.   HENT: Negative for congestion and trouble swallowing.    Eyes: Negative for photophobia and visual disturbance.   Respiratory: Negative for cough and shortness of breath.    Cardiovascular: Negative for chest pain and leg swelling.   Gastrointestinal:  Positive for abdominal pain, blood in stool, constipation and diarrhea. Negative for abdominal distention, nausea and vomiting.   Genitourinary: Negative for dysuria and hematuria.   Musculoskeletal: Negative for arthralgias and myalgias.   Skin: Negative for color change and rash.   Neurological: Negative for dizziness, light-headedness and numbness.   Psychiatric/Behavioral: Negative for agitation and confusion.           Objective:      Physical Exam   Constitutional: She is oriented to person, place, and time. She appears well-developed and well-nourished.   HENT:   Head: Normocephalic and atraumatic.   Eyes: Pupils are equal, round, and reactive to light. EOM are normal. No scleral icterus.   Neck: Normal range of motion. Neck supple.   Cardiovascular: Normal rate, regular rhythm, normal heart sounds and intact distal pulses.   Pulmonary/Chest: Effort normal and breath sounds normal. No respiratory distress.   Abdominal: Soft. Bowel sounds are normal. She exhibits no distension. There is no tenderness.   Musculoskeletal: Normal range of motion. She exhibits no edema.   Neurological: She is alert and oriented to person, place, and time.   No asterixis   Skin: Skin is warm and dry. No rash noted. No erythema.   Psychiatric: She has a normal mood and affect. Her behavior is normal.   Nursing note and vitals reviewed.        Pertinent labs and imaging studies reviewed    Assessment:       1. Polyp of colon, unspecified part of colon, unspecified type    2. Diarrhea, unspecified type    3. Abnormal liver enzymes        Plan:       Will plan for EGD/colonoscopy to evaluate for source of diarrhea  Can perform surveillance of colon polyps at that time  Will also exclude viral hepatitis with serology and look for sequelae of chronic liver disease on right upper quadrant ultrasound    (Portions of this note were dictated using voice recognition software and may contain dictation related errors in spelling/grammar/syntax  not found on text review)

## 2019-12-30 ENCOUNTER — HOSPITAL ENCOUNTER (OUTPATIENT)
Dept: RADIOLOGY | Facility: HOSPITAL | Age: 79
Discharge: HOME | End: 2019-12-30
Attending: INTERNAL MEDICINE
Payer: MEDICARE

## 2019-12-30 DIAGNOSIS — R74.8 ABNORMAL LIVER ENZYMES: ICD-10-CM

## 2019-12-30 PROCEDURE — 76705 ECHO EXAM OF ABDOMEN: CPT | Mod: TC

## 2019-12-30 PROCEDURE — 76705 US ABDOMEN LIMITED: ICD-10-PCS | Mod: 26,,, | Performed by: RADIOLOGY

## 2019-12-30 PROCEDURE — 76705 ECHO EXAM OF ABDOMEN: CPT | Mod: 26,,, | Performed by: RADIOLOGY

## 2020-01-06 ENCOUNTER — OFFICE VISIT (OUTPATIENT)
Dept: FAMILY MEDICINE | Facility: HOSPITAL | Age: 80
End: 2020-01-06
Attending: FAMILY MEDICINE
Payer: MEDICARE

## 2020-01-06 VITALS
DIASTOLIC BLOOD PRESSURE: 82 MMHG | HEART RATE: 94 BPM | WEIGHT: 124.75 LBS | TEMPERATURE: 98 F | SYSTOLIC BLOOD PRESSURE: 166 MMHG | BODY MASS INDEX: 20.05 KG/M2 | HEIGHT: 66 IN

## 2020-01-06 DIAGNOSIS — E55.9 VITAMIN D INSUFFICIENCY: Primary | ICD-10-CM

## 2020-01-06 DIAGNOSIS — I10 ESSENTIAL HYPERTENSION: ICD-10-CM

## 2020-01-06 DIAGNOSIS — R19.7 DIARRHEA, UNSPECIFIED TYPE: ICD-10-CM

## 2020-01-06 DIAGNOSIS — R79.89 INCREASED PTH LEVEL: ICD-10-CM

## 2020-01-06 PROCEDURE — 99213 OFFICE O/P EST LOW 20 MIN: CPT | Performed by: FAMILY MEDICINE

## 2020-01-06 NOTE — PROGRESS NOTES
"  Subjective:       Patient ID: Laura Patel is a 79 y.o. female.    Chief Complaint: Diarrhea and Osteoporosis    Patient here with the complaint of diarrhea on and off for the past year or greater.  States she can go weeks without diarrhea and frequently has episodes of constipation in between. She has right sided flank pain with constipation which is relieved with defecation. She denies any hematochezia or melena. Described as malodorous, watery, nonbloody. Diarrhea is made worse if she drinks milk. She cannot associate any other foods with diarrhea. She is concern because of the occasional stool incontinence(freequently occurs while sleeping). She states her stomach is  "gurgling" when she has diarrheal episodes.         Hypertension   This is a chronic problem. The current episode started more than 1 year ago. The problem is uncontrolled. Risk factors for coronary artery disease include sedentary lifestyle and post-menopausal state. Past treatments include calcium channel blockers. There are no compliance problems.        Review of Systems   Gastrointestinal: Positive for constipation and diarrhea. Negative for abdominal distention, abdominal pain, anal bleeding, blood in stool, nausea, rectal pain and vomiting.         Past Medical History:   Diagnosis Date    Arthritis     osteoarthritis of ankle    Hyperglycemia     Hypertension        Family History   Problem Relation Age of Onset    Breast cancer Other        Social History     Socioeconomic History    Marital status:      Spouse name: Not on file    Number of children: Not on file    Years of education: Not on file    Highest education level: Not on file   Occupational History    Not on file   Social Needs    Financial resource strain: Not on file    Food insecurity:     Worry: Not on file     Inability: Not on file    Transportation needs:     Medical: Not on file     Non-medical: Not on file   Tobacco Use    Smoking status: Never " "Smoker    Smokeless tobacco: Never Used   Substance and Sexual Activity    Alcohol use: No    Drug use: No    Sexual activity: Not on file   Lifestyle    Physical activity:     Days per week: Not on file     Minutes per session: Not on file    Stress: Not on file   Relationships    Social connections:     Talks on phone: Not on file     Gets together: Not on file     Attends Zoroastrian service: Not on file     Active member of club or organization: Not on file     Attends meetings of clubs or organizations: Not on file     Relationship status: Not on file   Other Topics Concern    Not on file   Social History Narrative    Not on file       Past Surgical History:   Procedure Laterality Date    BREAST BIOPSY      HYSTERECTOMY      OOPHORECTOMY           Current Outpatient Medications:     acetaminophen (TYLENOL) 325 MG tablet, Take 325 mg by mouth every 6 (six) hours as needed., Disp: , Rfl:     amLODIPine (NORVASC) 10 MG tablet, Take 1 tablet (10 mg total) by mouth once daily., Disp: 30 tablet, Rfl: 11    alendronate-vitamin D3 (FOSAMAX PLUS D) 70 mg- 5,600 unit per tablet, Take one pill weekly. (Patient not taking: Reported on 1/6/2020), Disp: 12 tablet, Rfl: 3    Checklist of Daily Activities:   independent for UE/LE dressing, toileting, brush teeth, and washface with no assistive devices.  Able to preform shopping for groceries, driving or using public transportation, using the telephone, meal preparation, housework, home repair, laundry, taking medications, handling finances.        Objective:      Body mass index is 20.14 kg/m².  Vitals:    01/06/20 1412   BP: (!) 166/82   Pulse: 94   Temp: 98.2 °F (36.8 °C)   Weight: 56.6 kg (124 lb 12.5 oz)   Height: 5' 6" (1.676 m)   PainSc: 0-No pain     Physical Exam   Constitutional: She is oriented to person, place, and time. She appears well-developed and well-nourished. No distress.   HENT:   Head: Normocephalic and atraumatic.   Eyes: Conjunctivae are " normal. No scleral icterus.   Neck: Normal range of motion. Neck supple.   Cardiovascular: Normal rate, regular rhythm, normal heart sounds and intact distal pulses.   Pulmonary/Chest: Effort normal and breath sounds normal. No stridor. No respiratory distress. She has no wheezes. She has no rales. She exhibits no tenderness.   Abdominal: Soft. Bowel sounds are normal. She exhibits no distension and no mass. There is no tenderness. There is no rebound and no guarding. No hernia.   Neurological: She is alert and oriented to person, place, and time.   Skin: Capillary refill takes less than 2 seconds. She is not diaphoretic.   Psychiatric: She has a normal mood and affect. Her behavior is normal. Judgment and thought content normal.       Assessment:       1. Vitamin D insufficiency    2. Increased PTH level    3. Essential hypertension    4. Diarrhea, unspecified type        Plan:       Vitamin D insufficiency  -     Vitamin D; Future; Expected date: 01/06/2020  Stable chronic condition.     Increased PTH level  -     PTH, intact; Future; Expected date: 01/06/2020  Stable chronic condition.     Essential hypertension  -     Comprehensive metabolic panel; Future; Expected date: 01/06/2020  Stable uncontrolled chronic condition. Continue current meditation(s).    Diarrhea, unspecified type  -     CBC auto differential; Future; Expected date: 01/06/2020  -     Magnesium; Future; Expected date: 01/06/2020  -     Phosphorus; Future; Expected date: 01/06/2020  Scehduled for colonoscopy on 1/29/20  Trial of probiotic.    Follow up in about 4 weeks (around 2/3/2020) for Hypertension diarrhea.        Goal BP<140/90      A total of 35 minutes were spent face-to-face with the patient during this encounter and over half of that time was spent on counseling and coordination of care. We discussed in depth the importance of adherence  low salt diet and exercise. I also educated the patient about lifestyle modifications which may  improve blood pressure.       I offered to discuss end of life issues, including information on how to make advance directives that the patient could use to name someone who would make medical decisions on their behalf if they became too ill to make themselves.     _x__Patient declined  ___Patient is interested, I provided paper work and offered to discuss.

## 2020-01-24 ENCOUNTER — TELEPHONE (OUTPATIENT)
Dept: GASTROENTEROLOGY | Facility: CLINIC | Age: 80
End: 2020-01-24

## 2020-01-24 NOTE — TELEPHONE ENCOUNTER
----- Message from Glenda Adam sent at 1/24/2020  2:05 PM CST -----  Contact: Patient  Patient called in regards to prep kit for colonoscopy    Please call patient at [phone]

## 2020-01-27 ENCOUNTER — TELEPHONE (OUTPATIENT)
Dept: ENDOSCOPY | Facility: HOSPITAL | Age: 80
End: 2020-01-27

## 2020-01-27 NOTE — TELEPHONE ENCOUNTER
Spoke with patient about arrival time @ 7.     Prep instructions reviewed: the day before the procedure, follow a clear liquid diet all day, then start the first 1/2 of prep at 5pm and take 2nd 1/2 of prep @ 1am.  Pt must be completely NPO when prep completed @ 2am.              Medications: Do not take Insulin or oral diabetic medications the day of the procedure.  Take as prescribed: heart, seizure and blood pressure medication in the morning with a sip of water (less than an ounce).  Take any breathing medications and bring inhalers to hospital with you Leave all valuables and jewelry at home.     Wear comfortable clothes to procedure to change into hospital gown You cannot drive for 24 hours after your procedure because you will receive sedation for your procedure to make you comfortable.  A ride must be provided at discharge.

## 2020-01-29 ENCOUNTER — HOSPITAL ENCOUNTER (OUTPATIENT)
Facility: HOSPITAL | Age: 80
Discharge: HOME | End: 2020-01-29
Attending: INTERNAL MEDICINE | Admitting: INTERNAL MEDICINE
Payer: MEDICARE

## 2020-01-29 ENCOUNTER — ANESTHESIA EVENT (OUTPATIENT)
Dept: ENDOSCOPY | Facility: HOSPITAL | Age: 80
End: 2020-01-29
Payer: MEDICARE

## 2020-01-29 ENCOUNTER — ANESTHESIA (OUTPATIENT)
Dept: ENDOSCOPY | Facility: HOSPITAL | Age: 80
End: 2020-01-29
Payer: MEDICARE

## 2020-01-29 VITALS
TEMPERATURE: 98 F | WEIGHT: 127 LBS | HEART RATE: 73 BPM | RESPIRATION RATE: 15 BRPM | BODY MASS INDEX: 20.41 KG/M2 | DIASTOLIC BLOOD PRESSURE: 64 MMHG | HEIGHT: 66 IN | OXYGEN SATURATION: 98 % | SYSTOLIC BLOOD PRESSURE: 128 MMHG

## 2020-01-29 DIAGNOSIS — R19.7 DIARRHEA, UNSPECIFIED TYPE: Primary | ICD-10-CM

## 2020-01-29 DIAGNOSIS — D12.6 COLONIC ADENOMA: ICD-10-CM

## 2020-01-29 DIAGNOSIS — R19.7 DIARRHEA: ICD-10-CM

## 2020-01-29 PROCEDURE — 88305 TISSUE EXAM BY PATHOLOGIST: CPT | Mod: 26,,, | Performed by: PATHOLOGY

## 2020-01-29 PROCEDURE — 88305 TISSUE EXAM BY PATHOLOGIST: CPT | Performed by: PATHOLOGY

## 2020-01-29 PROCEDURE — 45385 COLONOSCOPY W/LESION REMOVAL: CPT | Mod: ,,, | Performed by: INTERNAL MEDICINE

## 2020-01-29 PROCEDURE — 43239 EGD BIOPSY SINGLE/MULTIPLE: CPT | Mod: 51,,, | Performed by: INTERNAL MEDICINE

## 2020-01-29 PROCEDURE — 25000003 PHARM REV CODE 250: Performed by: INTERNAL MEDICINE

## 2020-01-29 PROCEDURE — 88305 TISSUE EXAM BY PATHOLOGIST: ICD-10-PCS | Mod: 26,,, | Performed by: PATHOLOGY

## 2020-01-29 PROCEDURE — 27201089 HC SNARE, DISP (ANY): Performed by: INTERNAL MEDICINE

## 2020-01-29 PROCEDURE — 37000008 HC ANESTHESIA 1ST 15 MINUTES: Performed by: INTERNAL MEDICINE

## 2020-01-29 PROCEDURE — 27201012 HC FORCEPS, HOT/COLD, DISP: Performed by: INTERNAL MEDICINE

## 2020-01-29 PROCEDURE — 63600175 PHARM REV CODE 636 W HCPCS: Performed by: INTERNAL MEDICINE

## 2020-01-29 PROCEDURE — 45380 COLONOSCOPY AND BIOPSY: CPT | Mod: 59,,, | Performed by: INTERNAL MEDICINE

## 2020-01-29 PROCEDURE — 63600175 PHARM REV CODE 636 W HCPCS: Performed by: NURSE ANESTHETIST, CERTIFIED REGISTERED

## 2020-01-29 PROCEDURE — 37000009 HC ANESTHESIA EA ADD 15 MINS: Performed by: INTERNAL MEDICINE

## 2020-01-29 PROCEDURE — 45385 PR COLONOSCOPY,REMV LESN,SNARE: ICD-10-PCS | Mod: ,,, | Performed by: INTERNAL MEDICINE

## 2020-01-29 PROCEDURE — 45380 COLONOSCOPY AND BIOPSY: CPT

## 2020-01-29 PROCEDURE — 43239 EGD BIOPSY SINGLE/MULTIPLE: CPT | Performed by: INTERNAL MEDICINE

## 2020-01-29 PROCEDURE — 43239 PR EGD, FLEX, W/BIOPSY, SGL/MULTI: ICD-10-PCS | Mod: 51,,, | Performed by: INTERNAL MEDICINE

## 2020-01-29 PROCEDURE — 45380 PR COLONOSCOPY,BIOPSY: ICD-10-PCS | Mod: 59,,, | Performed by: INTERNAL MEDICINE

## 2020-01-29 PROCEDURE — 45385 COLONOSCOPY W/LESION REMOVAL: CPT | Performed by: INTERNAL MEDICINE

## 2020-01-29 RX ORDER — SODIUM CHLORIDE 9 MG/ML
INJECTION, SOLUTION INTRAVENOUS CONTINUOUS
Status: DISCONTINUED | OUTPATIENT
Start: 2020-01-29 | End: 2020-01-29 | Stop reason: HOSPADM

## 2020-01-29 RX ORDER — SODIUM CHLORIDE 0.9 % (FLUSH) 0.9 %
10 SYRINGE (ML) INJECTION
Status: DISCONTINUED | OUTPATIENT
Start: 2020-01-29 | End: 2020-01-29 | Stop reason: HOSPADM

## 2020-01-29 RX ORDER — LIDOCAINE HYDROCHLORIDE 20 MG/ML
INJECTION INTRAVENOUS
Status: DISCONTINUED | OUTPATIENT
Start: 2020-01-29 | End: 2020-01-29

## 2020-01-29 RX ORDER — PROPOFOL 10 MG/ML
VIAL (ML) INTRAVENOUS CONTINUOUS PRN
Status: DISCONTINUED | OUTPATIENT
Start: 2020-01-29 | End: 2020-01-29

## 2020-01-29 RX ORDER — DEXTROMETHORPHAN/PSEUDOEPHED 2.5-7.5/.8
DROPS ORAL
Status: COMPLETED | OUTPATIENT
Start: 2020-01-29 | End: 2020-01-29

## 2020-01-29 RX ORDER — PROPOFOL 10 MG/ML
VIAL (ML) INTRAVENOUS
Status: DISCONTINUED | OUTPATIENT
Start: 2020-01-29 | End: 2020-01-29

## 2020-01-29 RX ADMIN — LIDOCAINE HYDROCHLORIDE 100 MG: 20 INJECTION, SOLUTION INTRAVENOUS at 08:01

## 2020-01-29 RX ADMIN — PROPOFOL 150 MCG/KG/MIN: 10 INJECTION, EMULSION INTRAVENOUS at 08:01

## 2020-01-29 RX ADMIN — SIMETHICONE 66 MG: 20 SUSPENSION/ DROPS ORAL at 08:01

## 2020-01-29 RX ADMIN — SODIUM CHLORIDE: 0.9 INJECTION, SOLUTION INTRAVENOUS at 07:01

## 2020-01-29 RX ADMIN — PROPOFOL 80 MG: 10 INJECTION, EMULSION INTRAVENOUS at 08:01

## 2020-01-29 NOTE — H&P
Short Stay Endoscopy History and Physical    PCP - Remington Amaya Iii, MD    Procedure - EGD/colonoscopy  ASA - II  Mallampati - per anesthesia  History of Anesthesia problems - no  Family history Anesthesia problems - no     HPI:  This is a 79 y.o. female here for evaluation of : Diarrhea      ROS:  Constitutional: No fevers, chills, No weight loss  ENT: No allergies  CV: No chest pain  Pulm: No shortness of breath  GI: see HPI  Derm: No rash    Medical History:  has a past medical history of Arthritis, Hyperglycemia, and Hypertension.    Surgical History:  has a past surgical history that includes Hysterectomy; Breast biopsy; and Oophorectomy.    Family History: family history includes Breast cancer in her other.. Otherwise no colon cancer, inflammatory bowel disease, or GI malignancies.    Social History:  reports that she has never smoked. She has never used smokeless tobacco. She reports that she does not drink alcohol or use drugs.    Review of patient's allergies indicates:   Allergen Reactions    Dicyclomine Other (See Comments) and Rash     Cause dizziness       Medications:   Medications Prior to Admission   Medication Sig Dispense Refill Last Dose    acetaminophen (TYLENOL) 325 MG tablet Take 325 mg by mouth every 6 (six) hours as needed.   Past Week at Unknown time    alendronate-vitamin D3 (FOSAMAX PLUS D) 70 mg- 5,600 unit per tablet Take one pill weekly. 12 tablet 3 Past Week at Unknown time    amLODIPine (NORVASC) 10 MG tablet Take 1 tablet (10 mg total) by mouth once daily. 30 tablet 11 1/29/2020 at Unknown time         Objective Findings:    Vital Signs: see nursing notes  Physical Exam:  General Appearance: Well appearing in no acute distress  Eyes:    No scleral icterus  ENT: Neck supple  Lungs: CTA anteriorly  Heart:  S1, S2 normal, no murmurs heard  Abdomen: Soft, non tender, non distended with positive bowel sounds. No hepatosplenomegaly, ascites, or mass  Extremities: no edema  Skin: No  rash      Labs:  Lab Results   Component Value Date    WBC 5.21 08/27/2018    HGB 14.0 08/27/2018    HCT 44.5 08/27/2018     08/27/2018    CHOL 238 (H) 08/27/2018    TRIG 145 08/27/2018    HDL 55 08/27/2018    ALT 17 08/27/2019    AST 22 08/27/2019     08/27/2019    K 3.9 08/27/2019     08/27/2019    CREATININE 0.9 08/27/2019    BUN 9 08/27/2019    CO2 30 (H) 08/27/2019    TSH 1.404 08/27/2018    HGBA1C 5.4 08/27/2018       I have explained the risks and benefits of endoscopy procedures to the patient including but not limited to bleeding, perforation, infection, and death.    Neal Moss MD

## 2020-01-29 NOTE — ANESTHESIA PREPROCEDURE EVALUATION
01/29/2020  Nicci Patel is a 79 y.o., female.    Anesthesia Evaluation     I have reviewed the Nursing Notes.   I have reviewed the Medications.     Review of Systems  Anesthesia Hx:  No problems with previous Anesthesia Denies Hx of Anesthetic complications Denies Family Hx of Anesthesia complications.    Social:  Non-Smoker, No Alcohol Use    Hematology/Oncology:  Hematology Normal   Oncology Normal     EENT/Dental:EENT/Dental Normal   Cardiovascular:   Exercise tolerance: good Hypertension  Functional Capacity good / => 4 METS    Pulmonary:  Pulmonary Normal    Renal/:  Renal/ Normal     Hepatic/GI:  Hepatic/GI Normal    Musculoskeletal:  Musculoskeletal Normal    Neurological:  Neurology Normal    Endocrine:  Endocrine Normal        Physical Exam  General:  Well nourished    Airway/Jaw/Neck:  Airway Findings: Mouth Opening: Normal Tongue: Normal  General Airway Assessment: Adult  Mallampati: II  TM Distance: Normal, at least 6 cm  Jaw/Neck Findings:     Neck ROM: Normal ROM      Dental:  Dental Findings: In tact        Mental Status:  Mental Status Findings:  Cooperative, Alert and Oriented         Anesthesia Plan  Type of Anesthesia, risks & benefits discussed:  Anesthesia Type:  MAC  Patient's Preference: MAC  Intra-op Monitoring Plan:   Intra-op Monitoring Plan Comments:   Post Op Pain Control Plan:   Post Op Pain Control Plan Comments:   Induction:   IV  Beta Blocker:         Informed Consent: Patient understands risks and agrees with Anesthesia plan.  Questions answered. Anesthesia consent signed with patient.  ASA Score: 2     Day of Surgery Review of History & Physical:            Ready For Surgery From Anesthesia Perspective.

## 2020-01-29 NOTE — DISCHARGE INSTRUCTIONS
Understanding Colon and Rectal Polyps    The colon (also called the large intestine) is a muscular tube that forms the last part of the digestive tract. It absorbs water and stores food waste. The colon is about 4 to 6 feet long. The rectum is the last 6 inches of the colon. The colon and rectum have a smooth lining composed of millions of cells. Changes in these cells can lead to growths in the colon that can become cancerous and should be removed. Multiple tests are available to screen for colon cancer, but the colonoscopy is the most recommended test. During colonoscopy, these polyps can be removed. How often you need this test depends on many things including your condition, your family history, symptoms, and what the findings were at the previous colonoscopy.   When the colon lining changes  Changes that happen in the cells that line the colon or rectum can lead to growths called polyps. Over a period of years, polyps can turn cancerous. Removing polyps early may prevent cancer from ever forming.  Polyps  Polyps are fleshy clumps of tissue that form on the lining of the colon or rectum. Small polyps are usually benign (not cancerous). However, over time, cells in a polyp can change and become cancerous. Certain types of polyps known as adenomatous polyps are premalignant. The risk for invasive cancer increases with the size of the polyp and certain cell and gene features. This means that they can become cancerous if they're not removed. Hyperplastic polyps are benign. They can grow quite large and not turn cancerous.   Cancer  Almost all colorectal cancers start when polyp cells begin growing abnormally. As a cancerous tumor grows, it may involve more and more of the colon or rectum. In time, cancer can also grow beyond the colon or rectum and spread to nearby organs or to glands called lymph nodes. The cells can also travel to other parts of the body. This is known as metastasis. The earlier a cancerous  tumor is removed, the better the chance of preventing its spread.    Date Last Reviewed: 8/1/2016  © 8091-1914 [employer]. [address] [place] [address], [place] [address]. All rights reserved. This information is not intended as a substitute for professional medical care. Always follow your healthcare professional's instructions.        Celiac Disease     With celiac disease, villi inside the small intestine become damaged and cannot absorb nutrients properly.     Celiac disease is caused by a sensitivity or allergy to gluten. This is a protein found in many grains such as wheat, barley, and rye. Celiac disease affects villi (tiny, fingerlike stalks) in the small bowel (intestine). Normally, the villi make it possible for the small bowel to absorb nutrients from the food you eat. But celiac disease damages the villi. As a result, you cant absorb the nutrients you need, even if you eat plenty of food. Celiac disease is an autoimmune disease. You can manage the disease by removing gluten from your diet. This relieves your symptoms. It also reverses the damage to your small bowel. Celiac disease is sometimes called celiac sprue.  Causes of celiac disease  Celiac disease may have a genetic component. This means it can be passed down in families. If your healthcare provider thinks that you have celiac disease, he or she may advise that other members of your family be checked for it as well.  Signs and symptoms of celiac disease  The symptoms of celiac disease can vary for each person. Some people have no symptoms at all. If symptoms do happen, they can include:  · Diarrhea, constipation, or both  · Light colored, foul-smelling or fatty stool  · Belly pain and cramping  · Belly swelling or bloating  · Weight loss  · Bone or joint pain  · Iron deficiency  · Headaches  · Tiredness and loss of energy  · Mood changes, irritability, and depression  · Infertility  · Unexplained elevated liver tests  · Canker  sores  · Skin rash  · Tooth enamel problems  Diagnosing celiac disease  Your healthcare provider will ask about your symptoms and health history. Youll also have a physical exam. Tests are then done to confirm the problem. These can include:  · Blood tests. These help check for specific proteins in the blood that are present with celiac disease. They also check for anemia and help rule out other problems. The tests are done by taking a blood sample.  · Upper endoscopy with biopsy. This is done to see inside the stomach and duodenum (first part of the small bowel). For the test, an endoscope is used. This is a thin, flexible tube with a tiny camera on the end. Its inserted through the mouth and down into the stomach and duodenum. Tools are passed through the endoscope to remove tiny tissue samples (biopsy). The tissue samples are taken to a lab and looked at under a microscope. This is to check the tiny villi for damage. This test must be done while you are still eating food with gluten. This is the only way to see whether the presence of gluten is damaging the villi.  · Genetic tests. These check for problems with specific genes linked to celiac disease. They are done by taking blood samples.  Treating celiac disease  To treat celiac disease, you must remove all sources of gluten from your diet. This will allow the villi to heal, so that nutrients can be absorbed normally. Its important to follow a strict, gluten-free diet daily, even if you dont have symptoms. If you dont do this, the small bowel can become permanently damaged, which can lead to serious health problems. These include bone disease, cancer of the small bowel, and various nervous system disorders.  Sources of gluten  Gluten is found in wheat, barley, and rye. The most common foods with gluten are those made with wheat flour. These include bread, pasta, cake, and cereal. Gluten is also often found in beer, gravies, salad dressings, and most  packaged foods. It is even found in some nonfood products, such as certain medicines and cosmetics. Your healthcare provider can refer you to a dietitian to  you about what you should avoid. The resources below will also give you lists of food and products that contain gluten.  Follow-up  Youll meet with your healthcare provider periodically to monitor your health. During these visits, routine blood tests are often done to make sure your condition is under control. Your healthcare provider can also refer you to other healthcare providers or support and advocacy groups to help you cope with your condition.  Learning more about celiac disease  The following resources can help you learn more about celiac disease and how to manage it:  · Celiac Disease Foundation, www.celiac.org  · Celiac Sprue Association, www.csaceliacs.org  · Gluten Intolerance Group, www.gluten.net  · National [place] of Diabetes and Digestive and Kidney Diseases, www2.niddk.nih.gov   Date Last Reviewed: 7/1/2016  © 2261-9156 [employer]. [address] [place] [address], [place] [address]. All rights reserved. This information is not intended as a substitute for professional medical care. Always follow your healthcare professional's instructions.

## 2020-01-29 NOTE — TRANSFER OF CARE
"Anesthesia Transfer of Care Note    Patient: Nicci Patel    Procedure(s) Performed: Procedure(s) (LRB):  EGD (ESOPHAGOGASTRODUODENOSCOPY) (N/A)  COLONOSCOPY (N/A)    Patient location: GI    Anesthesia Type: MAC    Transport from OR: Transported from OR on room air with adequate spontaneous ventilation    Post pain: adequate analgesia    Post assessment: no apparent anesthetic complications    Post vital signs: stable    Level of consciousness: awake, alert and oriented    Nausea/Vomiting: no nausea/vomiting    Complications: none    Transfer of care protocol was followed      Last vitals:   Visit Vitals  BP (!) 170/81 (BP Location: Left arm, Patient Position: Sitting)   Pulse 93   Temp 37.3 °C (99.1 °F) (Skin)   Resp 18   Ht 5' 6" (1.676 m)   Wt 57.6 kg (127 lb)   SpO2 98%   Breastfeeding? No   BMI 20.50 kg/m²     "

## 2020-01-29 NOTE — [employer] PATIENT INSTRUCTIONS
Discharge Summary/Instructions after an Endoscopic Procedure  Patient Name: Nicci Patel  Patient MRN: [id number]  Patient YOB: 1940 Wednesday, January 29, 2020  Neal Moss MD  RESTRICTIONS:  During your procedure today, you received medications for sedation.  These   medications may affect your judgment, balance and coordination.  Therefore,   for 24 hours, you have the following restrictions:   - DO NOT drive a car, operate machinery, make legal/financial decisions,   sign important papers or drink alcohol.    ACTIVITY:  Today: no heavy lifting, straining or running due to procedural   sedation/anesthesia.  The following day: return to full activity including work.  DIET:  Eat and drink normally unless instructed otherwise.     TREATMENT FOR COMMON SIDE EFFECTS:  - Mild abdominal pain, nausea, belching, bloating or excessive gas:  rest,   eat lightly and use a heating pad.  - Sore Throat: treat with throat lozenges and/or gargle with warm salt   water.  - Because air was used during the procedure, expelling large amounts of air   from your rectum or belching is normal.  - If a bowel prep was taken, you may not have a bowel movement for 1-3 days.    This is normal.  SYMPTOMS TO WATCH FOR AND REPORT TO YOUR PHYSICIAN:  1. Abdominal pain or bloating, other than gas cramps.  2. Chest pain.  3. Back pain.  4. Signs of infection such as: chills or fever occurring within 24 hours   after the procedure.  5. Rectal bleeding, which would show as bright red, maroon, or black stools.   (A tablespoon of blood from the rectum is not serious, especially if   hemorrhoids are present.)  6. Vomiting.  7. Weakness or dizziness.  GO DIRECTLY TO THE NEAREST EMERGENCY ROOM IF YOU HAVE ANY OF THE FOLLOWING:      Difficulty breathing              Chills and/or fever over 101 F   Persistent vomiting and/or vomiting blood   Severe abdominal pain   Severe chest pain   Black, tarry stools   Bleeding- more than one  tablespoon   Any other symptom or condition that you feel may need urgent attention  Your doctor recommends these additional instructions:  If any biopsies were taken, your doctors clinic will contact you in 1 to 2   weeks with any results.  - Discharge patient to home.   - Resume previous diet.   - Continue present medications.   - Await pathology results.   - Perform a colonoscopy as previously scheduled.  For questions, problems or results please call your physician - Neal Moss MD at Work:  [phone].  EMERGENCY PHONE NUMBER: [phone],  LAB RESULTS: [phone]  IF A COMPLICATION OR EMERGENCY SITUATION ARISES AND YOU ARE UNABLE TO REACH   YOUR PHYSICIAN - GO DIRECTLY TO THE EMERGENCY ROOM.  Neal Moss MD  1/29/2020 8:17:28 AM  This report has been verified and signed electronically.  [employer]

## 2020-01-29 NOTE — PROVATION PATIENT INSTRUCTIONS
Discharge Summary/Instructions after an Endoscopic Procedure  Patient Name: Nicci Patel  Patient MRN: [id number]  Patient YOB: 1940 Wednesday, January 29, 2020  Neal Moss MD  RESTRICTIONS:  During your procedure today, you received medications for sedation.  These   medications may affect your judgment, balance and coordination.  Therefore,   for 24 hours, you have the following restrictions:   - DO NOT drive a car, operate machinery, make legal/financial decisions,   sign important papers or drink alcohol.    ACTIVITY:  Today: no heavy lifting, straining or running due to procedural   sedation/anesthesia.  The following day: return to full activity including work.  DIET:  Eat and drink normally unless instructed otherwise.     TREATMENT FOR COMMON SIDE EFFECTS:  - Mild abdominal pain, nausea, belching, bloating or excessive gas:  rest,   eat lightly and use a heating pad.  - Sore Throat: treat with throat lozenges and/or gargle with warm salt   water.  - Because air was used during the procedure, expelling large amounts of air   from your rectum or belching is normal.  - If a bowel prep was taken, you may not have a bowel movement for 1-3 days.    This is normal.  SYMPTOMS TO WATCH FOR AND REPORT TO YOUR PHYSICIAN:  1. Abdominal pain or bloating, other than gas cramps.  2. Chest pain.  3. Back pain.  4. Signs of infection such as: chills or fever occurring within 24 hours   after the procedure.  5. Rectal bleeding, which would show as bright red, maroon, or black stools.   (A tablespoon of blood from the rectum is not serious, especially if   hemorrhoids are present.)  6. Vomiting.  7. Weakness or dizziness.  GO DIRECTLY TO THE NEAREST EMERGENCY ROOM IF YOU HAVE ANY OF THE FOLLOWING:      Difficulty breathing              Chills and/or fever over 101 F   Persistent vomiting and/or vomiting blood   Severe abdominal pain   Severe chest pain   Black, tarry stools   Bleeding- more than one  tablespoon   Any other symptom or condition that you feel may need urgent attention  Your doctor recommends these additional instructions:  If any biopsies were taken, your doctors clinic will contact you in 1 to 2   weeks with any results.  - Discharge patient to home.   - Resume previous diet.   - Continue present medications.   - Await pathology results.   - Repeat colonoscopy in 5 years for surveillance.   - Patient has a contact number available for emergencies.  The signs and   symptoms of potential delayed complications were discussed with the   patient.  Return to normal activities tomorrow.  Written discharge   instructions were provided to the patient.  For questions, problems or results please call your physician - Neal Moss MD at Work:  [phone].  EMERGENCY PHONE NUMBER: [phone],  LAB RESULTS: [phone]  IF A COMPLICATION OR EMERGENCY SITUATION ARISES AND YOU ARE UNABLE TO REACH   YOUR PHYSICIAN - GO DIRECTLY TO THE EMERGENCY ROOM.  Neal Moss MD  1/29/2020 8:50:10 AM  This report has been verified and signed electronically.  [employer]

## 2020-01-29 NOTE — ANESTHESIA POSTPROCEDURE EVALUATION
Anesthesia Post Evaluation    Patient: Nicci Patel    Procedure(s) Performed: Procedure(s) (LRB):  EGD (ESOPHAGOGASTRODUODENOSCOPY) (N/A)  COLONOSCOPY (N/A)    Final Anesthesia Type: MAC    Patient location during evaluation: GI PACU  Patient participation: Yes- Able to Participate  Level of consciousness: awake and alert and oriented  Post-procedure vital signs: reviewed and stable  Pain management: adequate  Airway patency: patent      Anesthetic complications: no      Cardiovascular status: blood pressure returned to baseline  Respiratory status: unassisted, spontaneous ventilation and room air  Hydration status: euvolemic  Follow-up not needed.          Vitals Value Taken Time   /60 1/29/2020  8:53 AM   Temp 36.8 °C (98.2 °F) 1/29/2020  8:53 AM   Pulse 96 1/29/2020  8:53 AM   Resp 16 1/29/2020  8:53 AM   SpO2 99 % 1/29/2020  8:53 AM         No case tracking events are documented in the log.      Pain/Velma Score: Velma Score: 8 (1/29/2020  8:53 AM)

## 2020-02-05 LAB
COMMENT: NORMAL
FINAL PATHOLOGIC DIAGNOSIS: NORMAL
GROSS: NORMAL

## 2020-02-06 NOTE — PROGRESS NOTES
Path reviewed. No cause of diarrhea noted. If symptoms persist pt may follow up with me. Repeat colonoscopy in 5 years if comborbidities allow.

## 2020-03-09 ENCOUNTER — OFFICE VISIT (OUTPATIENT)
Dept: FAMILY MEDICINE | Facility: HOSPITAL | Age: 80
End: 2020-03-09
Attending: FAMILY MEDICINE
Payer: MEDICARE

## 2020-03-09 VITALS
SYSTOLIC BLOOD PRESSURE: 139 MMHG | DIASTOLIC BLOOD PRESSURE: 88 MMHG | HEIGHT: 66 IN | WEIGHT: 127.44 LBS | HEART RATE: 88 BPM | BODY MASS INDEX: 20.48 KG/M2

## 2020-03-09 DIAGNOSIS — I10 ESSENTIAL HYPERTENSION: ICD-10-CM
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
## 2020-03-09 DIAGNOSIS — M25.551 PAIN OF BOTH HIP JOINTS: Primary | ICD-10-CM
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
## 2020-03-09 DIAGNOSIS — R35.0 URINARY FREQUENCY: ICD-10-CM

## 2020-03-09 DIAGNOSIS — M81.0 OSTEOPOROSIS, UNSPECIFIED OSTEOPOROSIS TYPE, UNSPECIFIED PATHOLOGICAL FRACTURE PRESENCE: ICD-10-CM

## 2020-03-09 DIAGNOSIS — M25.552 PAIN OF BOTH HIP JOINTS: Primary | ICD-10-CM

## 2020-03-09 DIAGNOSIS — R79.89 ELEVATED PARATHYROID HORMONE: ICD-10-CM

## 2020-03-09 DIAGNOSIS — Z28.21 PNEUMOCOCCAL VACCINE REFUSED: ICD-10-CM

## 2020-03-09 DIAGNOSIS — M54.50 LOW BACK PAIN, NON-SPECIFIC: ICD-10-CM

## 2020-03-09 PROCEDURE — 99214 OFFICE O/P EST MOD 30 MIN: CPT | Performed by: FAMILY MEDICINE

## 2020-03-09 RX ORDER — METHYLPREDNISOLONE 4 MG/1
TABLET ORAL
Refills: 0 | Status: CANCELLED | OUTPATIENT
Start: 2020-03-09 | End: 2020-03-30

## 2020-03-09 RX ORDER — SODIUM, POTASSIUM,MAG SULFATES 17.5-3.13G
SOLUTION, RECONSTITUTED, ORAL ORAL
COMMUNITY
Start: 2020-01-24 | End: 2022-12-12

## 2020-03-09 RX ORDER — AMLODIPINE BESYLATE 5 MG/1
5 TABLET ORAL DAILY
Qty: 90 TABLET | Refills: 3 | Status: SHIPPED | OUTPATIENT
Start: 2020-03-09 | End: 2022-07-05 | Stop reason: SDUPTHER

## 2020-03-09 RX ORDER — SULFAMETHOXAZOLE AND TRIMETHOPRIM 800; 160 MG/1; MG/1
2 TABLET ORAL 2 TIMES DAILY
Qty: 12 TABLET | Refills: 0 | Status: SHIPPED | OUTPATIENT
Start: 2020-03-09 | End: 2020-03-12

## 2020-03-09 NOTE — PROGRESS NOTES
Subjective:       Patient ID: Nicci Patel is a 79 y.o. female.    Chief Complaint: Hip Pain; Low-back Pain; and Hypertension    Hypertension   This is a chronic problem. The current episode started more than 1 year ago. The problem is controlled. Pertinent negatives include no chest pain, headaches or palpitations. There are no associated agents to hypertension. Risk factors for coronary artery disease include post-menopausal state and sedentary lifestyle. Compliance problems include diet.         Patient states fell in son's tub a little over a year. Has been having back pain on and off since. Describes pain as ache. Located in lumbar area. occasional radiation to buttock and posterior thigh bilaterally. Also describes numbness in legs when sitting more than 30 min. States she has stumbled over her own feet a number of times. States referred to PT, but she was 5 min late due to med transport and was denied visit. Pt never went back.      Review of Systems   Constitutional: Negative for activity change.   HENT: Negative for hearing loss.    Cardiovascular: Negative for chest pain, palpitations and leg swelling.   Genitourinary: Positive for frequency. Negative for difficulty urinating and dysuria.   Neurological: Negative for dizziness, speech difficulty, weakness, light-headedness, numbness and headaches.   Psychiatric/Behavioral: Negative.    All other systems reviewed and are negative.        Past Medical History:   Diagnosis Date    Arthritis     osteoarthritis of ankle    Hyperglycemia     Hypertension        Family History   Problem Relation Age of Onset    Breast cancer Other        Social History     Socioeconomic History    Marital status:      Spouse name: Not on file    Number of children: Not on file    Years of education: Not on file    Highest education level: Not on file   Occupational History    Not on file   Social Needs    Financial resource strain: Not on file    Food  insecurity:     Worry: Not on file     Inability: Not on file    Transportation needs:     Medical: Not on file     Non-medical: Not on file   Tobacco Use    Smoking status: Never Smoker    Smokeless tobacco: Never Used   Substance and Sexual Activity    Alcohol use: No    Drug use: No    Sexual activity: Not Currently   Lifestyle    Physical activity:     Days per week: Not on file     Minutes per session: Not on file    Stress: Not on file   Relationships    Social connections:     Talks on phone: Not on file     Gets together: Not on file     Attends Scientologist service: Not on file     Active member of club or organization: Not on file     Attends meetings of clubs or organizations: Not on file     Relationship status: Not on file   Other Topics Concern    Not on file   Social History Narrative    Not on file       Past Surgical History:   Procedure Laterality Date    BREAST BIOPSY      COLONOSCOPY N/A 1/29/2020    Procedure: COLONOSCOPY;  Surgeon: Neal Moss MD;  Location: [facility];  Service: Endoscopy;  Laterality: N/A;    ESOPHAGOGASTRODUODENOSCOPY N/A 1/29/2020    Procedure: EGD (ESOPHAGOGASTRODUODENOSCOPY);  Surgeon: Neal Moss MD;  Location: [facility];  Service: Endoscopy;  Laterality: N/A;    HYSTERECTOMY      OOPHORECTOMY           Current Outpatient Medications:     acetaminophen (TYLENOL) 325 MG tablet, Take 325 mg by mouth every 6 (six) hours as needed., Disp: , Rfl:     alendronate-vitamin D3 (FOSAMAX PLUS D) 70 mg- 5,600 unit per tablet, Take one pill weekly., Disp: 12 tablet, Rfl: 3    amLODIPine (NORVASC) 10 MG tablet, Take 1 tablet (10 mg total) by mouth once daily., Disp: 30 tablet, Rfl: 11    SUPREP BOWEL PREP KIT 17.5-3.13-1.6 gram SolR, MIX AND DRINK UTD, Disp: , Rfl:     sulfamethoxazole-trimethoprim 800-160mg (BACTRIM DS) 800-160 mg Tab, Take 2 tablets by mouth 2 (two) times daily. for 3 days, Disp: 12 tablet, Rfl: 0    Checklist of Daily  "Activities:   independent for UE/LE dressing, toileting, brush teeth, and washface with no assistive devices.  Able to preform shopping for groceries, driving or using public transportation, using the telephone, meal preparation, housework, home repair, laundry, taking medications, handling finances.      Depression Patient Health Questionnaire 3/9/2020 8/27/2019   Over the last two weeks how often have you been bothered by little interest or pleasure in doing things 0 0   Over the last two weeks how often have you been bothered by feeling down, depressed or hopeless 0 0   PHQ-2 Total Score 0 0         Objective:      Body mass index is 20.57 kg/m².  Vitals:    03/09/20 1022   BP: 139/88   Pulse: 88   Weight: 57.8 kg (127 lb 6.8 oz)   Height: 5' 6" (1.676 m)   PainSc:   8   PainLoc: Generalized     Physical Exam   Constitutional: She is oriented to person, place, and time. She appears well-developed and well-nourished.   HENT:   Head: Normocephalic and atraumatic.   Eyes: Pupils are equal, round, and reactive to light. Conjunctivae and EOM are normal.   Neck: Normal range of motion. Neck supple.   Cardiovascular: Normal rate, regular rhythm, normal heart sounds and intact distal pulses.   Pulmonary/Chest: Effort normal and breath sounds normal.   Abdominal: Soft. Bowel sounds are normal.   Musculoskeletal: Normal range of motion.        Lumbar back: She exhibits tenderness and pain. She exhibits normal range of motion, no bony tenderness, no swelling, no edema, no deformity, no spasm and normal pulse.   SLR neg   Neurological: She is alert and oriented to person, place, and time. She has normal strength. No cranial nerve deficit or sensory deficit. Gait normal.   Skin: Skin is warm and dry. Capillary refill takes less than 2 seconds.   Psychiatric: She has a normal mood and affect. Her behavior is normal. Judgment and thought content normal.       Assessment:       1. Pain of both hip joints    2. Low back pain, " non-specific    3. Essential hypertension    4. Osteoporosis, unspecified osteoporosis type, unspecified pathological fracture presence    5. Fall in home, initial encounter    6. Elevated parathyroid hormone    7. Urinary frequency        Plan:       Pain of both hip joints  -     X-Ray Hips Bilateral 2 View Incl AP Pelvis; Future; Expected date: 03/09/2020    Low back pain, non-specific  -     X-Ray Lumbar Spine Complete 5 View; Future; Expected date: 03/09/2020    Essential hypertension  -     Comprehensive metabolic panel; Future; Expected date: 03/09/2020    Osteoporosis, unspecified osteoporosis type, unspecified pathological fracture presence  -     Vitamin D; Future; Expected date: 03/09/2020  -     PTH, intact; Future; Expected date: 03/09/2020  -     Phosphorus; Future; Expected date: 03/09/2020    Fall in home, initial encounter  -     X-Ray Hips Bilateral 2 View Incl AP Pelvis; Future; Expected date: 03/09/2020    Elevated parathyroid hormone  -     PTH, intact; Future; Expected date: 03/09/2020  -     Phosphorus; Future; Expected date: 03/09/2020    UTI  -     sulfamethoxazole-trimethoprim 800-160mg (BACTRIM DS) 800-160 mg Tab; Take 2 tablets by mouth 2 (two) times daily. for 3 days  Dispense: 12 tablet; Refill: 0                Urine dip ordered     Follow up in 4 weeks        I offered to discuss end of life issues, including information on how to make advance directives that the patient could use to name someone who would make medical decisions on their behalf if they became too ill to make themselves.     ___Patient declined  _x__Patient is interested, I provided paper work and offered to discuss.

## 2020-10-14 ENCOUNTER — TELEPHONE (OUTPATIENT)
Dept: FAMILY MEDICINE | Facility: HOSPITAL | Age: 80
End: 2020-10-14

## 2020-10-14 NOTE — TELEPHONE ENCOUNTER
----- Message from Lorene Parker MA sent at 10/14/2020  9:33 AM CDT -----  Contact: Patient [phone]  Patient states she requested an appointment with Dr. Amaya; said she left prior message and no one called her back.  Please call patient with next available appointment.  Thanks.     Attempted to contact patient with medication dose change, no answer.  Letter sent with instructions and requested a call back to confirm and to update contact information.

## 2020-10-14 NOTE — TELEPHONE ENCOUNTER
Called patient advised that Dr. Amaya does not have any available appointments at this time. Offered appointment with another provider, patient declined. Advised patient I would call when a opening is available.

## 2021-11-17 PROBLEM — I10 HYPERTENSION: Status: ACTIVE | Noted: 2021-11-17

## 2022-01-11 ENCOUNTER — LAB VISIT (OUTPATIENT)
Dept: PRIMARY CARE CLINIC | Facility: CLINIC | Age: 82
End: 2022-01-11
Payer: MEDICARE

## 2022-01-11 DIAGNOSIS — Z20.822 CONTACT WITH AND (SUSPECTED) EXPOSURE TO COVID-19: ICD-10-CM

## 2022-01-11 LAB
CTP QC/QA: YES
SARS-COV-2 AG RESP QL IA.RAPID: NEGATIVE

## 2022-01-11 PROCEDURE — 87811 SARS-COV-2 COVID19 W/OPTIC: CPT

## 2022-06-22 ENCOUNTER — TELEPHONE (OUTPATIENT)
Dept: FAMILY MEDICINE | Facility: HOSPITAL | Age: 82
End: 2022-06-22
Payer: MEDICARE

## 2022-06-22 NOTE — TELEPHONE ENCOUNTER
----- Message from Lorene Parker MA sent at 6/21/2022  9:41 AM CDT -----  Contact: [phone]  Patient left message last week for next available appointment with Dr. Amaya. Said no one has contacted her.  Please call patient.  Thanks.

## 2022-07-05 ENCOUNTER — OFFICE VISIT (OUTPATIENT)
Dept: FAMILY MEDICINE | Facility: HOSPITAL | Age: 82
End: 2022-07-05
Attending: FAMILY MEDICINE
Payer: MEDICARE

## 2022-07-05 ENCOUNTER — LAB VISIT (OUTPATIENT)
Dept: LAB | Facility: HOSPITAL | Age: 82
End: 2022-07-05
Attending: FAMILY MEDICINE
Payer: MEDICARE

## 2022-07-05 VITALS
BODY MASS INDEX: 19.24 KG/M2 | SYSTOLIC BLOOD PRESSURE: 156 MMHG | HEIGHT: 66 IN | DIASTOLIC BLOOD PRESSURE: 73 MMHG | HEART RATE: 84 BPM | WEIGHT: 119.69 LBS

## 2022-07-05 DIAGNOSIS — R73.03 PREDIABETES: ICD-10-CM

## 2022-07-05 DIAGNOSIS — R79.9 ABNORMAL FINDING OF BLOOD CHEMISTRY, UNSPECIFIED: ICD-10-CM

## 2022-07-05 DIAGNOSIS — E21.1 HYPERPARATHYROIDISM DUE TO VITAMIN D DEFICIENCY: ICD-10-CM

## 2022-07-05 DIAGNOSIS — Z87.440 HISTORY OF UTI: ICD-10-CM

## 2022-07-05 DIAGNOSIS — E55.9 VITAMIN D DEFICIENCY: ICD-10-CM

## 2022-07-05 DIAGNOSIS — M80.00XS OSTEOPOROSIS WITH CURRENT PATHOLOGICAL FRACTURE, UNSPECIFIED OSTEOPOROSIS TYPE, SEQUELA: Primary | ICD-10-CM

## 2022-07-05 DIAGNOSIS — E21.3 HYPERPARATHYROIDISM: ICD-10-CM

## 2022-07-05 DIAGNOSIS — I10 HYPERTENSION, UNSPECIFIED TYPE: ICD-10-CM

## 2022-07-05 LAB
BACTERIA #/AREA URNS HPF: NORMAL /HPF
BILIRUB UR QL STRIP: NEGATIVE
CLARITY UR: CLEAR
COLOR UR: YELLOW
GLUCOSE UR QL STRIP: NEGATIVE
HGB UR QL STRIP: NEGATIVE
KETONES UR QL STRIP: NEGATIVE
LEUKOCYTE ESTERASE UR QL STRIP: ABNORMAL
MICROSCOPIC COMMENT: NORMAL
NITRITE UR QL STRIP: NEGATIVE
PH UR STRIP: 5 [PH] (ref 5–8)
PROT UR QL STRIP: NEGATIVE
RBC #/AREA URNS HPF: 2 /HPF (ref 0–4)
SP GR UR STRIP: 1.02 (ref 1–1.03)
SQUAMOUS #/AREA URNS HPF: 1 /HPF
URN SPEC COLLECT METH UR: ABNORMAL
UROBILINOGEN UR STRIP-ACNC: NEGATIVE EU/DL
WBC #/AREA URNS HPF: 2 /HPF (ref 0–5)

## 2022-07-05 PROCEDURE — 99213 OFFICE O/P EST LOW 20 MIN: CPT | Performed by: FAMILY MEDICINE

## 2022-07-05 PROCEDURE — 81000 URINALYSIS NONAUTO W/SCOPE: CPT | Performed by: FAMILY MEDICINE

## 2022-07-05 RX ORDER — LOSARTAN POTASSIUM 100 MG/1
100 TABLET ORAL DAILY
COMMUNITY
Start: 2022-05-25 | End: 2023-08-02

## 2022-07-05 RX ORDER — AMLODIPINE BESYLATE 5 MG/1
5 TABLET ORAL
COMMUNITY
Start: 2022-05-18 | End: 2023-08-02 | Stop reason: CLARIF

## 2022-07-05 NOTE — PROGRESS NOTES
Progress Note   Family Medicine    Subjective:    Chief Complaint: Follow-up and Hypertension      History of Present Illness:     Patient ID: Nicci Patel is a 82 y.o. female presents for multiple issues.     HPI    She has history of hypertension.  Blood pressure in office is 156/73. At home it runs in the range of 120-130/70-80. She has chronic left shoulder arthralgia.     H/o Follicular lymphoma, grade 2, stage IV, who had a complete response to treatment with bendamustine rituximab.        Review of Systems   Constitutional:  Negative for chills and fever.   Musculoskeletal:  Negative for myalgias.   Neurological:  Negative for facial asymmetry, weakness and numbness.   Psychiatric/Behavioral:  Negative for self-injury and suicidal ideas.        The following portions of the patient's history were reviewed and updated as appropriate: allergies, current medications, past family history, past medical history, past social history, past surgical history and problem list.    Past Medical History:   Diagnosis Date    Arthritis     osteoarthritis of ankle    Hyperglycemia     Hypertension        Family History   Problem Relation Age of Onset    Leukemia Mother     Stomach cancer Sister     Diabetes Brother     Hypertension Brother     Heart attacks under age 50 Brother     Breast cancer Other        Social History     Socioeconomic History    Marital status:    Tobacco Use    Smoking status: Never    Smokeless tobacco: Never   Substance and Sexual Activity    Alcohol use: No    Drug use: No    Sexual activity: Not Currently       Past Surgical History:   Procedure Laterality Date    BREAST BIOPSY      COLONOSCOPY N/A 1/29/2020    Procedure: COLONOSCOPY;  Surgeon: Neal Moss MD;  Location: [facility];  Service: Endoscopy;  Laterality: N/A;    ESOPHAGOGASTRODUODENOSCOPY N/A 1/29/2020    Procedure: EGD (ESOPHAGOGASTRODUODENOSCOPY);  Surgeon: Neal Moss MD;  Location: [facility];   "Service: Endoscopy;  Laterality: N/A;    HYSTERECTOMY      OOPHORECTOMY           Current Outpatient Medications:     acetaminophen (TYLENOL) 325 MG tablet, Take 325 mg by mouth every 6 (six) hours as needed., Disp: , Rfl:     amLODIPine (NORVASC) 5 MG tablet, Take 5 mg by mouth., Disp: , Rfl:     losartan (COZAAR) 100 MG tablet, Take 100 mg by mouth once daily., Disp: , Rfl:     alendronate-vitamin D3 (FOSAMAX PLUS D) 70 mg- 5,600 unit per tablet, Take one pill weekly., Disp: 12 tablet, Rfl: 3    SUPREP BOWEL PREP KIT 17.5-3.13-1.6 gram SolR, MIX AND DRINK UTD, Disp: , Rfl:     Review of patient's allergies indicates:   Allergen Reactions    Dicyclomine Other (See Comments) and Rash     Cause dizziness       Social History     Substance and Sexual Activity   Sexual Activity Not Currently        Fall Risk Assessment 3/30/2014 11/21/2013 11/6/2013   Is the patient at risk for fall? No - No   History Of Fall (W/I 3 Mos) - 0-->No -   Polypharmacy - 0-->No -   Central Nervous System/Psychotropic Medication - 0-->No -   Cardiovascular Medication - 3-->Yes -   Age Greater Than 65 Years - 2-->Yes -   Altered Elimination - 0-->No -   Cognitive Deficit - 0-->No -   Sensory Deficit - 0-->No -   Dizziness/Vertigo - 0-->No -   Depression - 0-->No -   Mobility Deficit/Weakness - 0-->No -   Male - 0-->No -   Fall Risk Score - 5 -       The ASCVD Risk score (Ewen DK, et al., 2019) failed to calculate for the following reasons:    The 2019 ASCVD risk score is only valid for ages 40 to 79     Physical Examination    BP (!) 156/73   Pulse 84   Ht 5' 6" (1.676 m)   Wt 54.3 kg (119 lb 11.4 oz)   LMP  (LMP Unknown)   BMI 19.32 kg/m²   Wt Readings from Last 3 Encounters:   07/05/22 54.3 kg (119 lb 11.4 oz)   03/09/20 57.8 kg (127 lb 6.8 oz)   01/29/20 57.6 kg (127 lb)     BP Readings from Last 3 Encounters:   07/05/22 (!) 156/73   03/09/20 139/88   01/29/20 128/64     Estimated body mass index is 19.32 kg/m² as calculated from the " "following:    Height as of this encounter: 5' 6" (1.676 m).    Weight as of this encounter: 54.3 kg (119 lb 11.4 oz).     Physical Exam  Constitutional:       General: She is not in acute distress.     Appearance: Normal appearance. She is not ill-appearing, toxic-appearing or diaphoretic.   HENT:      Head: Normocephalic and atraumatic.   Eyes:      General: No scleral icterus.     Conjunctiva/sclera: Conjunctivae normal.   Cardiovascular:      Rate and Rhythm: Normal rate and regular rhythm.      Pulses: Normal pulses.      Heart sounds: Normal heart sounds.   Pulmonary:      Effort: Pulmonary effort is normal.      Breath sounds: Normal breath sounds.   Skin:     Findings: No rash.   Neurological:      General: No focal deficit present.      Mental Status: She is alert and oriented to person, place, and time.   Psychiatric:         Mood and Affect: Mood normal.         Behavior: Behavior normal.        Laboratory    I have reviewed old labs below:    Lab Results   Component Value Date    WBC 5.21 08/27/2018    HGB 14.0 08/27/2018    HCT 44.5 08/27/2018     08/27/2018    CHOL 210 (H) 07/05/2022    TRIG 136 07/05/2022    HDL 57 07/05/2022    ALT 17 07/05/2022    AST 18 07/05/2022     07/05/2022    K 3.8 07/05/2022     07/05/2022    CREATININE 0.9 07/05/2022    BUN 25 (H) 07/05/2022    CO2 28 07/05/2022    TSH 1.404 08/27/2018    HGBA1C 6.0 (H) 07/05/2022       Lab reviewed by me: labs reviewed, I note that glycosylated hemoglobin normal, mildly abnormal but acceptable.     I  Assessment     1. Osteoporosis with current pathological fracture, unspecified osteoporosis type, sequela    2. Hypertension, unspecified type    3. Hyperparathyroidism due to vitamin D deficiency    4. Vitamin D deficiency    5. Hyperparathyroidism    6. Abnormal finding of blood chemistry, unspecified     7. History of UTI    8. Prediabetes         Plan     Hypertension, unspecified type  -     Hemoglobin A1C; Future; " Expected date: 07/05/2022  -     Comprehensive Metabolic Panel; Future; Expected date: 07/05/2022  -     Lipid Panel; Future; Expected date: 07/05/2022  Discussed med adherence.  Stable chronic condition. Continue current meditation(s).    Hyperparathyroidism due to vitamin D deficiency  Cont Vit D and Ca suppl.  Stable chronic condition. Continue current meditation(s).    Osteoporosis with current pathological fracture, unspecified osteoporosis type, sequela  Need repeat dexa  Stated unable to tolerate Bisphosphates.       Vitamin D deficiency  -     Misc Sendout Test, Blood 25-hydroxy vitamin D; Future; Expected date: 07/05/2022      Abnormal finding of blood chemistry, unspecified   -     Hemoglobin A1C; Future; Expected date: 07/05/2022    History of UTI  -     Urinalysis; Future; Expected date: 07/05/2022      Follow up in about 3 months (around 10/5/2022). for further workup and reassessment if labs and tests obtained are stable or sooner as needed.         Goal BP<140/90  Goal A1C <7.0  Goal BMI <30    A total of 35 minutes were spent face-to-face with the patient during this encounter and over half of that time was spent on counseling and coordination of care. We discussed in depth the importance of adherence low salt diet and exercise. I also educated the patient about lifestyle modifications which may improve blood pressure.     This note is dictated using the M*Modal Fluency Direct word recognition program. There are word recognition mistakes that are occasionally missed on review.    Remington Amaya M.D.

## 2022-12-13 ENCOUNTER — TELEPHONE (OUTPATIENT)
Dept: FAMILY MEDICINE | Facility: HOSPITAL | Age: 82
End: 2022-12-13
Payer: MEDICARE

## 2023-01-11 ENCOUNTER — HOSPITAL ENCOUNTER (OUTPATIENT)
Dept: RADIOLOGY | Facility: HOSPITAL | Age: 83
Discharge: HOME | End: 2023-01-11
Attending: FAMILY MEDICINE
Payer: MEDICARE

## 2023-01-11 DIAGNOSIS — M80.00XS OSTEOPOROSIS WITH CURRENT PATHOLOGICAL FRACTURE, UNSPECIFIED OSTEOPOROSIS TYPE, SEQUELA: ICD-10-CM

## 2023-01-11 DIAGNOSIS — E21.3 HYPERPARATHYROIDISM: ICD-10-CM

## 2023-01-11 DIAGNOSIS — E55.9 VITAMIN D DEFICIENCY: ICD-10-CM

## 2023-01-11 PROCEDURE — 77080 DXA BONE DENSITY AXIAL: CPT | Mod: 26,,, | Performed by: RADIOLOGY

## 2023-01-11 PROCEDURE — 77080 DXA BONE DENSITY AXIAL: CPT | Mod: TC

## 2023-01-11 PROCEDURE — 77080 DEXA BONE DENSITY SPINE HIP: ICD-10-PCS | Mod: 26,,, | Performed by: RADIOLOGY

## 2023-04-03 ENCOUNTER — TELEPHONE (OUTPATIENT)
Dept: FAMILY MEDICINE | Facility: HOSPITAL | Age: 83
End: 2023-04-03
Payer: MEDICARE

## 2023-04-03 NOTE — TELEPHONE ENCOUNTER
----- Message from Huong Nguyen sent at 3/23/2023  9:46 AM CDT -----  Regarding: set appt  Pt called to set up an appt with Dr Amaya

## 2023-04-25 ENCOUNTER — OFFICE VISIT (OUTPATIENT)
Dept: FAMILY MEDICINE | Facility: HOSPITAL | Age: 83
End: 2023-04-25
Attending: FAMILY MEDICINE
Payer: MEDICARE

## 2023-04-25 DIAGNOSIS — C82.18 FOLLICULAR LYMPHOMA GRADE II OF LYMPH NODES OF MULTIPLE SITES: Primary | ICD-10-CM

## 2023-04-25 DIAGNOSIS — I10 HYPERTENSION, UNSPECIFIED TYPE: ICD-10-CM

## 2023-04-25 DIAGNOSIS — E21.1 HYPERPARATHYROIDISM DUE TO VITAMIN D DEFICIENCY: ICD-10-CM

## 2023-04-25 DIAGNOSIS — R73.03 PREDIABETES: ICD-10-CM

## 2023-04-25 DIAGNOSIS — M81.0 AGE-RELATED OSTEOPOROSIS WITHOUT CURRENT PATHOLOGICAL FRACTURE: ICD-10-CM

## 2023-04-25 PROCEDURE — 99213 OFFICE O/P EST LOW 20 MIN: CPT | Performed by: FAMILY MEDICINE

## 2023-04-25 NOTE — PROGRESS NOTES
Subjective:       Patient ID: Nicci Patel is a 85 y.o. female.    Chief Complaint: Follow-up    HPI    Ms. 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 She is clinically stable and asymptomatic from an infectious or cardiopulmonary standpoint. She denies fever, chills, weight loss, night sweats, rash, cough, dyspnea, chest pain, abdominal discomfort, nausea, vomiting, diarrhea, or urinary changes.    She did not take her antihypertensive medication (losartan was previously discontinued due to pruritus; current agent unspecified) this morning. In-office blood pressure is 157/80 mmHg. She notes home readings are usually controlled, ranging from 120-130/70-80 mmHg.    She also reports chronic left shoulder arthralgia but denies acute worsening.    Review of Systems   HENT:  Negative for hearing loss.    Eyes:  Negative for visual disturbance.   Respiratory:  Negative for shortness of breath.    Cardiovascular:  Negative for chest pain, palpitations and leg swelling.   Musculoskeletal:  Positive for arthralgias.         Past Medical History:   Diagnosis Date    Arthritis     osteoarthritis of ankle    Hyperglycemia     Hypertension        Family History   Problem Relation Age of Onset    Leukemia Mother     Stomach cancer Sister     Diabetes Brother     Hypertension Brother     Heart attacks under age 50 Brother     Breast cancer Other        Social History     Socioeconomic History    Marital status:    Tobacco Use    Smoking status: Never    Smokeless tobacco: Never   Substance and Sexual Activity    Alcohol use: No    Drug use: No    Sexual activity: Not Currently       Past Surgical History:   Procedure Laterality Date    BREAST BIOPSY      COLONOSCOPY N/A 1/29/2020     "Procedure: COLONOSCOPY;  Surgeon: Neal Moss MD;  Location: [facility];  Service: Endoscopy;  Laterality: N/A;    ESOPHAGOGASTRODUODENOSCOPY N/A 1/29/2020    Procedure: EGD (ESOPHAGOGASTRODUODENOSCOPY);  Surgeon: Neal Moss MD;  Location: [facility];  Service: Endoscopy;  Laterality: N/A;    HYSTERECTOMY      OOPHORECTOMY           Current Outpatient Medications:     acetaminophen (TYLENOL) 325 MG tablet, Take 325 mg by mouth every 6 (six) hours as needed., Disp: , Rfl:     alendronate-vitamin D3 (FOSAMAX PLUS D) 70 mg- 5,600 unit per tablet, Take one pill weekly., Disp: 12 tablet, Rfl: 3    hydroCHLOROthiazide (MICROZIDE) 12.5 mg capsule, Take 12.5 mg by mouth once daily., Disp: , Rfl:     NIFEdipine (PROCARDIA-XL) 30 MG (OSM) 24 hr tablet, TAKE 1 TABLET(30 MG) BY MOUTH EVERY DAY, Disp: 30 tablet, Rfl: 1    polycarbophil (FIBERCON) 625 mg tablet, Take 1 tablet (625 mg total) by mouth once daily., Disp: 30 tablet, Rfl: 11    Checklist of Daily Activities:   independent for UE/LE dressing, toileting, brush teeth, and washface with no assistive devices.  Able to preform shopping for groceries, driving or using public transportation, using the telephone, meal preparation, housework, home repair, laundry, taking medications, handling finances.        Objective:      Body mass index is 19.96 kg/m².  Vitals:    04/25/23 1316   BP: (!) 157/80   Pulse: 82   Weight: 56.1 kg (123 lb 10.9 oz)   Height: 5' 6" (1.676 m)   PainSc: 0-No pain   Repeat /82    Physical Exam (Focused):  General: Alert, oriented, no acute distress  HEENT: No lymphadenopathy or masses appreciated  CV: RRR, no murmur  Resp: Lungs CTA bilaterally  Abdomen: Soft, nontender, no distension  Extremities: No edema, no calf tenderness  MSK: Mild left shoulder tenderness, full range of motion  Skin: Healing surgical scar on right forearm, palpable subcutaneous nodules noted (per H/O exam)      Assessment:       1. Follicular " lymphoma grade II of lymph nodes of multiple sites    2. Prediabetes    3. Hyperparathyroidism due to vitamin D deficiency    4. Hypertension, unspecified type        Plan:       Follicular lymphoma grade II of lymph nodes of multiple sites  Followed by oncology. Presents for clearance to continue rituximab. Clinically stable, afebrile, no signs of active infection or decompensation.  Prediabetes  Stable chronic condition. Continue current meditation(s).  See below    Hyperparathyroidism due to vitamin D deficiency  Stable chronic condition. Continue current meditation(s).     Hypertension, unspecified type  Suboptimally controlled in-office today. Nonadherence likely contributed. Reinforced adherence to antihypertensive regimen. Blood pressure controlled(130/80) at Cardiology appt last month.    Chronic left shoulder pain   Stable. Symptomatic management continued.     I have recommended that this patient have a immunization for Flu, Covid, Pneumovax but she declines at this time. I have discussed the risks and benefits of this examination with her. The patient verbalizes understanding.       Follow-Up:  PCP: 1 months or sooner if symptoms arise  Continue close follow-up with hematology-oncology team    Goal BP<140/90  Goal A1C <7.0  Goal BMI <30    A total of 25 minutes were spent face-to-face with the patient during this encounter and over half of that time was spent on counseling and coordination of care. We discussed in depth the importance of adherence diabetic low salt diet and exercise. I also educated the patient about lifestyle modifications which may improve blood pressure.       I offered to discuss end of life issues, including information on how to make advance directives that the patient could use to name someone who would make medical decisions on their behalf if they became too ill to make themselves.     __ x_Patient declined  ___Patient is interested, I provided paper work and offered to discuss.

## 2023-07-14 ENCOUNTER — TELEPHONE (OUTPATIENT)
Dept: FAMILY MEDICINE | Facility: HOSPITAL | Age: 83
End: 2023-07-14
Payer: MEDICARE

## 2023-07-14 NOTE — TELEPHONE ENCOUNTER
----- Message from Lucian Baez sent at 7/11/2023  9:53 AM CDT -----  Regarding: please reschedule  This pt is scheduled for today to see Dr. Amaya but her ride did not show up. she would like to reschedule.

## 2023-07-24 ENCOUNTER — TELEPHONE (OUTPATIENT)
Dept: FAMILY MEDICINE | Facility: HOSPITAL | Age: 83
End: 2023-07-24
Payer: MEDICARE

## 2023-07-24 NOTE — TELEPHONE ENCOUNTER
----- Message from Huong Nguyen sent at 7/24/2023 11:19 AM CDT -----  Regarding: scedule an appt  Pt called to schedule an appt with the Dr.said she been calling to schedule an appt with the Dr and nobody called her. Pt said she want to take a urine test and she has pain in her left shoulder.. call back # [phone]

## 2023-08-02 ENCOUNTER — HOSPITAL ENCOUNTER (OUTPATIENT)
Dept: RADIOLOGY | Facility: HOSPITAL | Age: 83
Discharge: HOME | End: 2023-08-02
Attending: FAMILY MEDICINE
Payer: MEDICARE

## 2023-08-02 ENCOUNTER — OFFICE VISIT (OUTPATIENT)
Dept: FAMILY MEDICINE | Facility: HOSPITAL | Age: 83
End: 2023-08-02
Attending: FAMILY MEDICINE
Payer: MEDICARE

## 2023-08-02 VITALS
SYSTOLIC BLOOD PRESSURE: 170 MMHG | WEIGHT: 124.56 LBS | BODY MASS INDEX: 20.02 KG/M2 | HEART RATE: 88 BPM | DIASTOLIC BLOOD PRESSURE: 85 MMHG | HEIGHT: 66 IN

## 2023-08-02 DIAGNOSIS — I10 HYPERTENSION, UNSPECIFIED TYPE: Primary | ICD-10-CM

## 2023-08-02 DIAGNOSIS — M25.512 ACUTE PAIN OF LEFT SHOULDER: ICD-10-CM

## 2023-08-02 DIAGNOSIS — M54.2 NECK PAIN ON LEFT SIDE: ICD-10-CM

## 2023-08-02 DIAGNOSIS — K59.00 CONSTIPATION, UNSPECIFIED CONSTIPATION TYPE: ICD-10-CM

## 2023-08-02 DIAGNOSIS — R30.0 DYSURIA: ICD-10-CM

## 2023-08-02 PROCEDURE — 99213 OFFICE O/P EST LOW 20 MIN: CPT | Performed by: FAMILY MEDICINE

## 2023-08-02 PROCEDURE — 73030 XR SHOULDER COMPLETE 2 OR MORE VIEWS LEFT: ICD-10-PCS | Mod: 26,LT,, | Performed by: INTERNAL MEDICINE

## 2023-08-02 PROCEDURE — 72040 XR CERVICAL SPINE AP LATERAL: ICD-10-PCS | Mod: 26,,, | Performed by: INTERNAL MEDICINE

## 2023-08-02 PROCEDURE — 73030 X-RAY EXAM OF SHOULDER: CPT | Mod: 26,LT,, | Performed by: INTERNAL MEDICINE

## 2023-08-02 PROCEDURE — 72040 X-RAY EXAM NECK SPINE 2-3 VW: CPT | Mod: 26,,, | Performed by: INTERNAL MEDICINE

## 2023-08-02 PROCEDURE — 72040 X-RAY EXAM NECK SPINE 2-3 VW: CPT | Mod: TC,FY

## 2023-08-02 PROCEDURE — 73030 X-RAY EXAM OF SHOULDER: CPT | Mod: TC,FY,LT

## 2023-08-02 RX ORDER — NIFEDIPINE 30 MG/1
30 TABLET, EXTENDED RELEASE ORAL DAILY
Qty: 30 TABLET | Refills: 1 | Status: SHIPPED | OUTPATIENT
Start: 2023-08-02 | End: 2023-10-10

## 2023-08-02 RX ORDER — SULFAMETHOXAZOLE AND TRIMETHOPRIM 800; 160 MG/1; MG/1
1 TABLET ORAL 2 TIMES DAILY
Qty: 10 TABLET | Refills: 0 | Status: SHIPPED | OUTPATIENT
Start: 2023-08-02 | End: 2023-08-07

## 2023-08-02 RX ORDER — CALCIUM POLYCARBOPHIL 625 MG
625 TABLET ORAL DAILY
Qty: 30 TABLET | Refills: 11 | Status: SHIPPED | OUTPATIENT
Start: 2023-08-02 | End: 2024-08-01

## 2023-10-06 DIAGNOSIS — I10 HYPERTENSION, UNSPECIFIED TYPE: ICD-10-CM

## 2023-10-09 DIAGNOSIS — I10 HYPERTENSION, UNSPECIFIED TYPE: ICD-10-CM

## 2023-10-10 RX ORDER — NIFEDIPINE 30 MG/1
30 TABLET, EXTENDED RELEASE ORAL
Qty: 30 TABLET | Refills: 1 | Status: SHIPPED | OUTPATIENT
Start: 2023-10-10 | End: 2023-12-11 | Stop reason: SDUPTHER

## 2023-10-10 RX ORDER — NIFEDIPINE 30 MG/1
30 TABLET, EXTENDED RELEASE ORAL DAILY
Qty: 30 TABLET | Refills: 1 | OUTPATIENT
Start: 2023-10-10 | End: 2024-10-09

## 2023-10-11 DIAGNOSIS — I10 HYPERTENSION, UNSPECIFIED TYPE: ICD-10-CM

## 2023-10-11 NOTE — TELEPHONE ENCOUNTER
----- Message from Huong Nguyen sent at 10/6/2023  3:29 PM CDT -----  Regarding: refill on med  Pt called for a refill on med NIFEdipine (PROCARDIA-XL) 30 MG (OSM) 24 hr tablet.. send to [id number] - [place], [place] - [address]   Phone:  [phone]  Fax:  [phone]      Pt # [phone] (M)

## 2023-10-15 RX ORDER — NIFEDIPINE 30 MG/1
30 TABLET, EXTENDED RELEASE ORAL
Qty: 30 TABLET | Refills: 1 | OUTPATIENT
Start: 2023-10-15

## 2023-12-11 DIAGNOSIS — I10 HYPERTENSION, UNSPECIFIED TYPE: ICD-10-CM

## 2023-12-11 DIAGNOSIS — M80.00XS OSTEOPOROSIS WITH CURRENT PATHOLOGICAL FRACTURE, UNSPECIFIED OSTEOPOROSIS TYPE, SEQUELA: ICD-10-CM

## 2023-12-11 DIAGNOSIS — E55.9 VITAMIN D INSUFFICIENCY: ICD-10-CM

## 2023-12-11 PROBLEM — M81.0 AGE-RELATED OSTEOPOROSIS WITHOUT CURRENT PATHOLOGICAL FRACTURE: Status: ACTIVE | Noted: 2023-12-11

## 2023-12-11 PROBLEM — C82.18 FOLLICULAR LYMPHOMA GRADE II OF LYMPH NODES OF MULTIPLE SITES: Status: ACTIVE | Noted: 2023-12-11

## 2023-12-11 RX ORDER — HYDROCHLOROTHIAZIDE 12.5 MG/1
12.5 CAPSULE ORAL DAILY
COMMUNITY
Start: 2023-09-22 | End: 2023-12-11 | Stop reason: SDUPTHER

## 2023-12-11 RX ORDER — NIFEDIPINE 30 MG/1
30 TABLET, EXTENDED RELEASE ORAL DAILY
Qty: 90 TABLET | Refills: 3 | Status: SHIPPED | OUTPATIENT
Start: 2023-12-11 | End: 2024-12-10

## 2023-12-11 RX ORDER — HYDROCHLOROTHIAZIDE 12.5 MG/1
12.5 CAPSULE ORAL DAILY
Qty: 90 CAPSULE | Refills: 3 | Status: SHIPPED | OUTPATIENT
Start: 2023-12-11 | End: 2024-12-10

## 2023-12-11 NOTE — TELEPHONE ENCOUNTER
----- Message from Adrian Bennett sent at 12/11/2023  2:15 PM CST -----  Type:  Needs Medical Advice    Who Called: patient  Would the patient rather a call back or a response via [facility]? call  Best Call Back Number: [phone]  Additional Information:  She is completely out of her medication the pharmacy has been calling and we have not responded.   Disp Refills Start End ASHLEIGH  NIFEdipine (PROCARDIA-XL) 30 MG (OSM) 24 hr tablet 30 tablet 1 10/10/2023 - No  Sig - Route: TAKE 1 TABLET(30 MG) BY MOUTH EVERY DAY - Oral

## 2023-12-11 NOTE — PROGRESS NOTES
Subjective:       Patient ID: Nicci Patel is a 83 y.o. female.    Chief Complaint: Shoulder Pain    HPI      Subjective:      Nicci Patel is a 85 y.o. female who presents with left shoulder pain. The symptoms began a week ago. Aggravating factors: no known event. Pain is located between the neck and shoulder and diffusely throughout the shoulder. Discomfort is described as aching. Symptoms are exacerbated by repetitive movements, overhead movements, and lying on the shoulder. Evaluation to date: none. Therapy to date includes: avoidance of offending activity.      Review of Systems   Respiratory:  Negative for shortness of breath.    Cardiovascular:  Negative for chest pain and palpitations.         Past Medical History:   Diagnosis Date    Arthritis     osteoarthritis of ankle    Hyperglycemia     Hypertension        Family History   Problem Relation Age of Onset    Leukemia Mother     Stomach cancer Sister     Diabetes Brother     Hypertension Brother     Heart attacks under age 50 Brother     Breast cancer Other        Social History     Socioeconomic History    Marital status:    Tobacco Use    Smoking status: Never    Smokeless tobacco: Never   Substance and Sexual Activity    Alcohol use: No    Drug use: No    Sexual activity: Not Currently       Past Surgical History:   Procedure Laterality Date    BREAST BIOPSY      COLONOSCOPY N/A 1/29/2020    Procedure: COLONOSCOPY;  Surgeon: Neal Moss MD;  Location: [facility];  Service: Endoscopy;  Laterality: N/A;    ESOPHAGOGASTRODUODENOSCOPY N/A 1/29/2020    Procedure: EGD (ESOPHAGOGASTRODUODENOSCOPY);  Surgeon: Neal Moss MD;  Location: [facility];  Service: Endoscopy;  Laterality: N/A;    HYSTERECTOMY      OOPHORECTOMY           Current Outpatient Medications:     acetaminophen (TYLENOL) 325 MG tablet, Take 325 mg by mouth every 6 (six) hours as needed., Disp: , Rfl:     alendronate-vitamin D3 (FOSAMAX PLUS D) 70 mg-  "5,600 unit per tablet, Take one pill weekly., Disp: 12 tablet, Rfl: 3    NIFEdipine (PROCARDIA-XL) 30 MG (OSM) 24 hr tablet, TAKE 1 TABLET(30 MG) BY MOUTH EVERY DAY, Disp: 30 tablet, Rfl: 1    polycarbophil (FIBERCON) 625 mg tablet, Take 1 tablet (625 mg total) by mouth once daily., Disp: 30 tablet, Rfl: 11    Checklist of Daily Activities:   independent for UE/LE dressing, toileting, brush teeth, and washface with no assistive devices.  Able to preform shopping for groceries, driving or using public transportation, using the telephone, meal preparation, housework, home repair, laundry, taking medications, handling finances.        Objective:      Body mass index is 20.1 kg/m².  Vitals:    08/02/23 1141   BP: (!) 170/85   Pulse: 88   Weight: 56.5 kg (124 lb 9 oz)   Height: 5' 6" (1.676 m)   PainSc:   6   PainLoc: Shoulder        Physical Exam  Vitals and nursing note reviewed.   Constitutional:       Appearance: Normal appearance. She is normal weight.   HENT:      Head: Normocephalic and atraumatic.   Cardiovascular:      Rate and Rhythm: Normal rate and regular rhythm.      Pulses: Normal pulses.      Heart sounds: No murmur heard.     No friction rub. No gallop.   Pulmonary:      Effort: Pulmonary effort is normal.      Breath sounds: Normal breath sounds.   Musculoskeletal:      Left shoulder: Tenderness present. No swelling, deformity, effusion, laceration, bony tenderness or crepitus. Decreased range of motion. Normal strength.      Cervical back: Full passive range of motion without pain, normal range of motion and neck supple. No pain with movement, spinous process tenderness or muscular tenderness. Normal range of motion.   Neurological:      General: No focal deficit present.      Mental Status: She is alert.   Psychiatric:         Mood and Affect: Mood normal.         Behavior: Behavior normal.         Thought Content: Thought content normal.         Judgment: Judgment normal.         Assessment:       1. " Hypertension, unspecified type    2. Acute pain of left shoulder    3. Neck pain on left side    4. Constipation, unspecified constipation type    5. Dysuria        Plan:       Hypertension, unspecified type  Uncontrolled  chronic condition. Continue current meditation(s).   -     NIFEdipine (PROCARDIA-XL) 30 MG (OSM) 24 hr tablet; Take 1 tablet (30 mg total) by mouth once daily.  Dispense: 30 tablet; Refill: 1    Acute pain of left shoulder  -     X-Ray Shoulder 2 or More Views Left; Future; Expected date: 08/02/2023    Neck pain on left side  -     X-Ray Cervical Spine AP And Lateral; Future; Expected date: 08/02/2023    Constipation, unspecified constipation type  -     polycarbophil (FIBERCON) 625 mg tablet; Take 1 tablet (625 mg total) by mouth once daily.  Dispense: 30 tablet; Refill: 11    Dysuria  -     sulfamethoxazole-trimethoprim 800-160mg (BACTRIM DS) 800-160 mg Tab; Take 1 tablet by mouth 2 (two) times daily. for 5 days  Dispense: 10 tablet; Refill: 0      Follow up in about 2 weeks (around 8/16/2023) for Hypertension, Constipation.        Goal BP<140/90  Goal BMI <30    A total of 25 minutes were spent face-to-face with the patient during this encounter and over half of that time was spent on counseling and coordination of care. We discussed in depth the importance of adherence diabetic low salt diet and exercise. I also educated the patient about lifestyle modifications which may improve blood pressure.       I offered to discuss end of life issues, including information on how to make advance directives that the patient could use to name someone who would make medical decisions on their behalf if they became too ill to make themselves.     _x__Patient declined  ___Patient is interested, I provided paper work and offered to discuss.

## 2024-05-20 ENCOUNTER — TELEPHONE (OUTPATIENT)
Dept: FAMILY MEDICINE | Facility: HOSPITAL | Age: 84
End: 2024-05-20
Payer: MEDICARE

## 2024-05-20 NOTE — TELEPHONE ENCOUNTER
Needs appt ----- Message from Ana Deshpande sent at 5/16/2024  2:28 PM CDT -----  Regarding: return call  Type:  Patient Returning Call    Who Called:pt  Who Left Message for Patient:office  Does the patient know what this is regarding?:return call   Would the patient rather a call back or a response via [facility]? Call  Best Call Back Number:[phone]  Additional Information:

## 2024-10-17 ENCOUNTER — TELEPHONE (OUTPATIENT)
Dept: FAMILY MEDICINE | Facility: HOSPITAL | Age: 84
End: 2024-10-17
Payer: MEDICARE

## 2025-05-19 VITALS
WEIGHT: 123.69 LBS | DIASTOLIC BLOOD PRESSURE: 80 MMHG | HEART RATE: 82 BPM | SYSTOLIC BLOOD PRESSURE: 157 MMHG | HEIGHT: 66 IN | BODY MASS INDEX: 19.88 KG/M2

## 2025-06-03 ENCOUNTER — OFFICE VISIT (OUTPATIENT)
Dept: FAMILY MEDICINE | Facility: HOSPITAL | Age: 85
End: 2025-06-03
Attending: FAMILY MEDICINE
Payer: MEDICARE

## 2025-06-03 VITALS
SYSTOLIC BLOOD PRESSURE: 180 MMHG | OXYGEN SATURATION: 99 % | DIASTOLIC BLOOD PRESSURE: 99 MMHG | HEIGHT: 66 IN | HEART RATE: 84 BPM | WEIGHT: 112 LBS | BODY MASS INDEX: 18 KG/M2

## 2025-06-03 DIAGNOSIS — R30.0 DYSURIA: Primary | ICD-10-CM

## 2025-06-03 DIAGNOSIS — I10 HYPERTENSION, UNSPECIFIED TYPE: ICD-10-CM

## 2025-06-03 DIAGNOSIS — M54.50 ACUTE BILATERAL LOW BACK PAIN WITHOUT SCIATICA: ICD-10-CM

## 2025-06-03 LAB
BILIRUB SERPL-MCNC: ABNORMAL MG/DL
BLOOD, POC UA: ABNORMAL
GLUCOSE UR QL STRIP: ABNORMAL
KETONES UR QL STRIP: ABNORMAL
LEUKOCYTE ESTERASE URINE, POC: ABNORMAL
NITRITE, POC UA: ABNORMAL
PH, POC UA: 6
PROTEIN, POC: ABNORMAL
SPECIFIC GRAVITY, POC UA: 1
UROBILINOGEN, POC UA: ABNORMAL

## 2025-06-03 PROCEDURE — 81003 URINALYSIS AUTO W/O SCOPE: CPT | Performed by: FAMILY MEDICINE

## 2025-06-03 PROCEDURE — 99213 OFFICE O/P EST LOW 20 MIN: CPT | Performed by: FAMILY MEDICINE

## 2025-06-03 RX ORDER — NITROFURANTOIN (MACROCRYSTALS) 100 MG/1
100 CAPSULE ORAL EVERY 12 HOURS
Qty: 10 CAPSULE | Refills: 0 | Status: SHIPPED | OUTPATIENT
Start: 2025-06-03 | End: 2025-06-08

## 2025-06-03 RX ORDER — NIFEDIPINE 60 MG/1
60 TABLET, EXTENDED RELEASE ORAL DAILY
Qty: 90 TABLET | Refills: 0 | Status: SHIPPED | OUTPATIENT
Start: 2025-06-03 | End: 2026-06-03

## 2025-06-17 ENCOUNTER — OFFICE VISIT (OUTPATIENT)
Dept: FAMILY MEDICINE | Facility: HOSPITAL | Age: 85
End: 2025-06-17
Attending: FAMILY MEDICINE
Payer: MEDICARE

## 2025-06-17 VITALS
BODY MASS INDEX: 17.96 KG/M2 | HEIGHT: 66 IN | OXYGEN SATURATION: 96 % | HEART RATE: 95 BPM | WEIGHT: 111.75 LBS | DIASTOLIC BLOOD PRESSURE: 72 MMHG | SYSTOLIC BLOOD PRESSURE: 158 MMHG

## 2025-06-17 DIAGNOSIS — M81.0 AGE-RELATED OSTEOPOROSIS WITHOUT CURRENT PATHOLOGICAL FRACTURE: ICD-10-CM
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
## 2025-06-17 DIAGNOSIS — I10 UNCONTROLLED HYPERTENSION: Primary | ICD-10-CM

## 2025-06-17 DIAGNOSIS — C82.18 FOLLICULAR LYMPHOMA GRADE II OF LYMPH NODES OF MULTIPLE SITES: ICD-10-CM

## 2025-06-17 PROCEDURE — 99213 OFFICE O/P EST LOW 20 MIN: CPT | Performed by: FAMILY MEDICINE

## 2025-06-17 NOTE — PROGRESS NOTES
"  S - Subjective  Chief Complaint:  Follow-up visit  History of Present Illness (HPI):    Ms. Patel is an 85-year-old female with a history of Stage IV Grade 3A Follicular Lymphoma, hypertension, and osteoporosis. She presents today for follow-up. She reports:  Night sweats occurring intermittently.  Itchy insect bites on bilateral arms and forearms, described as small, punctate, erythematous spots (~5mm). No pets at home.    She remains on single-agent rituximab therapy for lymphoma and is aware of her treatment plan and warning signs. She continues Prolia for osteoporosis and takes calcium and vitamin D daily.    Hypertension:  BP remains elevated (173/84 and 158/72 today). Patient was counseled on the risks of uncontrolled hypertension, including stroke and myocardial infarction. Despite this, she declined any medication adjustment, stating:  We all have to die some time.    Review of Systems:  Constitutional: Reports fatigue, night sweats  Skin: Itchy red bumps on arms  Cardiovascular: Denies chest pain or palpitations  Respiratory: No shortness of breath  GI/: No complaints  Neuro: No dizziness or focal deficits    O - Objective  Vitals (06/17/25):  BP: 173/84 ? 158/72  Pulse: 95 bpm  SpO?: 96%  Weight: 50.7 kg (111 lb 12.4 oz)  Height: 5'6"  BMI: 18.04 kg/m²  Pain Score: 0/10    Physical Exam:  General: Alert, oriented, frail-appearing elderly female  Skin: Multiple small erythematous papules on bilateral forearms, consistent with insect bites  Cardiovascular: Regular rate and rhythm  Respiratory: Clear to auscultation  Neuro: No focal deficits  Port site: Clean, no signs of infection    A - Assessment  Stage IV Grade 3A Follicular Lymphoma - on maintenance rituximab; ECOG 2  Hypertension - persistently elevated; patient refuses medication adjustment  Osteoporosis - on Prolia; compliant with calcium and vitamin D  Pruritic insect bites - likely environmental; no signs of secondary infection  Night sweats - " likely related to lymphoma  At risk for dehydration - monitor closely    P - Plan  Lymphoma:  Continue rituximab 375 mg/m² IV q12 weeks x 2 years (C1D1 = 3/14/25)  Monitor for fatigue, infection, or worsening symptoms  Supportive meds: EMLA, Zofran PRN    Hypertension:  BP remains elevated; discussed risks of stroke and MI  Patient declined medication changes, stating we all have to die some time  Will readdress at next visit or if symptoms develop  Goal BP < 140/90    Osteoporosis:  Continue Prolia; last dose 3/5/25, next due 9/25  Continue calcium and vitamin D supplementation    Skin/Itching:  Topical hydrocortisone cream PRN for itching  Monitor for signs of infection    Dehydration Risk:  Encourage oral hydration  IV fluids PRN if symptomatic    Preventive Care:  Advance care planning discussed; patient declined at this time    Follow-Up:  Routine follow-up in oncology  F/U week after Cards appoint. Pt to discuss BP meds.  Monitor BP, hydration status, and lymphoma symptoms

## 2025-06-25 DIAGNOSIS — Z78.0 MENOPAUSE: ICD-10-CM

## 2025-07-03 DIAGNOSIS — I10 HYPERTENSION, UNSPECIFIED TYPE: ICD-10-CM

## 2025-07-03 NOTE — TELEPHONE ENCOUNTER
Copied from CRM #[id number]. Topic: Medications - Medication Question  >> Jul 3, 2025  9:33 AM Vivian wrote:  Type:  RX Refill Request    Who Called: pt   Refill or New Rx:new rx   RX Name and Strength:NIFEdipine (PROCARDIA-XL) 30 MG (OSM) 24 hr tablet   How is the patient currently taking it? (ex. 1XDay):Sig - Route: Take 1 tablet (30 mg total) by mouth once daily. - Oral   Is this a 30 day or 90 day RX:90  Preferred Pharmacy with phone number:[id number] - [place] - [address] AT [address] [place] [address]  Phone: [phone] Fax: [phone]  Hours: Not open 24 hours      Local or Mail Order:local  Ordering Provider:   Would the patient rather a call back or a response via [facility]? Call back   Best Call Back Number:[phone] ()    Additional Information: pt requesting a call back in regards to change of medication pt states meds suppose to be 30mg not 60mg the 60 mg is too much

## 2025-07-07 ENCOUNTER — TELEPHONE (OUTPATIENT)
Dept: FAMILY MEDICINE | Facility: HOSPITAL | Age: 85
End: 2025-07-07
Payer: MEDICARE

## 2025-07-07 NOTE — TELEPHONE ENCOUNTER
Copied from CRM #[id number]. Topic: Medications - Medication Question  >> Jul 7, 2025  2:07 PM Isabellemana wrote:  Type: General Call Back     Name of Caller:pt  Symptoms:NIFEdipine (PROCARDIA-XL) 60 MG (OSM) 24 hr tablet  Would the patient rather a call back or a response via [facility]? Call back  Best Call Back Number:[phone]  Additional Information: Pt is requesting that her medication be put back down to 30 mgs and not 60. She sts her Cardiologist stated that she can not take that high amount of a dosage. Pt is out of her medication.      Informed patient her message has been routed to provider for confirmation on medication due to the conversation her and her cardiologist had.

## 2025-07-08 RX ORDER — NIFEDIPINE 30 MG/1
30 TABLET, EXTENDED RELEASE ORAL DAILY
Qty: 90 TABLET | Refills: 3 | Status: SHIPPED | OUTPATIENT
Start: 2025-07-08 | End: 2026-07-08

## 2025-07-14 ENCOUNTER — TELEPHONE (OUTPATIENT)
Dept: FAMILY MEDICINE | Facility: HOSPITAL | Age: 85
End: 2025-07-14
Payer: MEDICARE

## 2025-07-14 NOTE — TELEPHONE ENCOUNTER
Returned patient phone call no answer left voicemail for a call back.         Copied from CRM #[id number]. Topic: General Inquiry - Patient Advice  >> Jul 14, 2025  9:53 AM Vivian wrote:  Type:  Needs Medical Advice    Who Called: pt   Symptoms (please be specific): knot that has turned to a sore   How long has patient had these symptoms:  unknown   Pharmacy name and phone #:  [id number] - [place] - [address] AT [facility] [place] [address] [address]  Phone: [phone] Fax: [phone]  Hours: Not open 24 hours      Would the patient rather a call back or a response via [facility]? Call back   Best Call Back Number: [phone] (M)    Additional Information:pt requesting a call back  in regards to a knot that has turned in to a sore and is hurting pt states has left multiple messages and no one has called her

## 2025-07-22 ENCOUNTER — TELEPHONE (OUTPATIENT)
Dept: FAMILY MEDICINE | Facility: HOSPITAL | Age: 85
End: 2025-07-22
Payer: MEDICARE

## 2025-07-22 NOTE — TELEPHONE ENCOUNTER
Copied from CRM #[id number]. Topic: Appointments - Appointment Access  >> Jul 21, 2025  9:48 AM Bessie wrote:  Type:  Pt advice     Who Called: Pt   Does the patient know what this is regarding?: regarding procedure for abscess on back, pt unsure if she should go back to [facility]. Pt very upset and states no one ever responds to her   Would the patient rather a call back or a response via [facility]? Call   Best Call Back Number: [phone]   Additional Information:    Returned patient call in attempt to schedule no answer voicemail left.        >> Jul 22, 2025 11:58 AM Remington Amaya MD wrote:  Pt needs next available appointment with any provider or she can come tomorrow at  9AM to see me.Dr. Amaya  ----- Message -----  From: Latoya Pool MA  Sent: 7/21/2025  10:09 AM CDT  To: Remington Amaya III, MD      ----- Message -----  From: Bessie Lees  Sent: 7/21/2025   9:53 AM CDT  To: [facility] Staff    >> Jul 21, 2025 10:09 AM Med Assistant Latoya wrote:    ----- Message -----  From: Bessie Lees  Sent: 7/21/2025   9:53 AM CDT  To: Jose Luis Macedo Staff